# Patient Record
Sex: MALE | Race: WHITE | NOT HISPANIC OR LATINO | Employment: UNEMPLOYED | RURAL
[De-identification: names, ages, dates, MRNs, and addresses within clinical notes are randomized per-mention and may not be internally consistent; named-entity substitution may affect disease eponyms.]

---

## 2017-01-26 DIAGNOSIS — Z20.5 EXPOSURE TO HEPATITIS: Primary | ICD-10-CM

## 2017-01-28 LAB
HAV IGM SERPL QL IA: NEGATIVE
HBV CORE IGM SERPL QL IA: NEGATIVE
HBV SURFACE AG SERPL QL IA: NEGATIVE
HCV AB S/CO SERPL IA: <0.1 S/CO RATIO (ref 0–0.9)

## 2017-02-02 PROBLEM — Z00.00 WELLNESS EXAMINATION: Status: ACTIVE | Noted: 2017-02-02

## 2017-03-21 ENCOUNTER — OFFICE VISIT (OUTPATIENT)
Dept: FAMILY MEDICINE CLINIC | Facility: CLINIC | Age: 37
End: 2017-03-21

## 2017-03-21 VITALS
OXYGEN SATURATION: 97 % | TEMPERATURE: 98.4 F | DIASTOLIC BLOOD PRESSURE: 100 MMHG | HEIGHT: 64 IN | HEART RATE: 102 BPM | SYSTOLIC BLOOD PRESSURE: 146 MMHG | RESPIRATION RATE: 18 BRPM | BODY MASS INDEX: 26.98 KG/M2 | WEIGHT: 158 LBS

## 2017-03-21 DIAGNOSIS — G89.29 CHRONIC LOW BACK PAIN, UNSPECIFIED BACK PAIN LATERALITY, WITH SCIATICA PRESENCE UNSPECIFIED: Primary | Chronic | ICD-10-CM

## 2017-03-21 DIAGNOSIS — K76.0 FATTY LIVER: ICD-10-CM

## 2017-03-21 DIAGNOSIS — M19.90 ARTHRITIS: ICD-10-CM

## 2017-03-21 DIAGNOSIS — F10.20 ALCOHOLISM (HCC): ICD-10-CM

## 2017-03-21 DIAGNOSIS — F41.9 ANXIETY: Chronic | ICD-10-CM

## 2017-03-21 DIAGNOSIS — M54.5 CHRONIC LOW BACK PAIN, UNSPECIFIED BACK PAIN LATERALITY, WITH SCIATICA PRESENCE UNSPECIFIED: Primary | Chronic | ICD-10-CM

## 2017-03-21 PROCEDURE — 99213 OFFICE O/P EST LOW 20 MIN: CPT | Performed by: FAMILY MEDICINE

## 2017-03-21 RX ORDER — CHLORDIAZEPOXIDE HYDROCHLORIDE 25 MG/1
25 CAPSULE, GELATIN COATED ORAL 3 TIMES DAILY PRN
Qty: 30 CAPSULE | Refills: 0 | Status: SHIPPED | OUTPATIENT
Start: 2017-03-21 | End: 2017-08-10

## 2017-03-21 RX ORDER — METHYLPREDNISOLONE 4 MG/1
TABLET ORAL
Qty: 21 TABLET | Refills: 0 | Status: SHIPPED | OUTPATIENT
Start: 2017-03-21 | End: 2017-08-10

## 2017-03-21 NOTE — PATIENT INSTRUCTIONS
Stop aspirin while using the medrol/steroid  The steroid is a test; to see how you do with the arthritis  Should not drink while doing these Rx

## 2017-03-22 LAB
ALBUMIN SERPL-MCNC: 5.2 G/DL (ref 3.5–5.5)
ALBUMIN/GLOB SERPL: 1.8 {RATIO} (ref 1.2–2.2)
ALP SERPL-CCNC: 69 IU/L (ref 39–117)
ALT SERPL-CCNC: 221 IU/L (ref 0–44)
ANA SER QL: NEGATIVE
AST SERPL-CCNC: 269 IU/L (ref 0–40)
BASOPHILS # BLD AUTO: 0 X10E3/UL (ref 0–0.2)
BASOPHILS NFR BLD AUTO: 0 %
BILIRUB SERPL-MCNC: 0.7 MG/DL (ref 0–1.2)
BUN SERPL-MCNC: 7 MG/DL (ref 6–20)
BUN/CREAT SERPL: 9 (ref 8–19)
CALCIUM SERPL-MCNC: 9.5 MG/DL (ref 8.7–10.2)
CHLORIDE SERPL-SCNC: 95 MMOL/L (ref 96–106)
CO2 SERPL-SCNC: 21 MMOL/L (ref 18–29)
CREAT SERPL-MCNC: 0.74 MG/DL (ref 0.76–1.27)
CRP SERPL-MCNC: 4.2 MG/L (ref 0–4.9)
EOSINOPHIL # BLD AUTO: 0.2 X10E3/UL (ref 0–0.4)
EOSINOPHIL NFR BLD AUTO: 3 %
ERYTHROCYTE [DISTWIDTH] IN BLOOD BY AUTOMATED COUNT: 14.3 % (ref 12.3–15.4)
ERYTHROCYTE [SEDIMENTATION RATE] IN BLOOD BY WESTERGREN METHOD: 13 MM/HR (ref 0–15)
GLOBULIN SER CALC-MCNC: 2.9 G/DL (ref 1.5–4.5)
GLUCOSE SERPL-MCNC: 134 MG/DL (ref 65–99)
HCT VFR BLD AUTO: 47.9 % (ref 37.5–51)
HGB BLD-MCNC: 16.7 G/DL (ref 12.6–17.7)
IMM GRANULOCYTES # BLD: 0 X10E3/UL (ref 0–0.1)
IMM GRANULOCYTES NFR BLD: 0 %
LYMPHOCYTES # BLD AUTO: 2 X10E3/UL (ref 0.7–3.1)
LYMPHOCYTES NFR BLD AUTO: 27 %
MCH RBC QN AUTO: 32.7 PG (ref 26.6–33)
MCHC RBC AUTO-ENTMCNC: 34.9 G/DL (ref 31.5–35.7)
MCV RBC AUTO: 94 FL (ref 79–97)
MONOCYTES # BLD AUTO: 1.1 X10E3/UL (ref 0.1–0.9)
MONOCYTES NFR BLD AUTO: 15 %
NEUTROPHILS # BLD AUTO: 4.1 X10E3/UL (ref 1.4–7)
NEUTROPHILS NFR BLD AUTO: 55 %
PLATELET # BLD AUTO: 191 X10E3/UL (ref 150–379)
POTASSIUM SERPL-SCNC: 4 MMOL/L (ref 3.5–5.2)
PROT SERPL-MCNC: 8.1 G/DL (ref 6–8.5)
RBC # BLD AUTO: 5.1 X10E6/UL (ref 4.14–5.8)
RHEUMATOID FACT SERPL-ACNC: <10 IU/ML (ref 0–13.9)
SODIUM SERPL-SCNC: 140 MMOL/L (ref 134–144)
URATE SERPL-MCNC: 4.6 MG/DL (ref 3.7–8.6)
WBC # BLD AUTO: 7.3 X10E3/UL (ref 3.4–10.8)

## 2017-03-22 NOTE — PROGRESS NOTES
Subjective   Andi Curran is a 36 y.o. male presenting with chief complaint of:   Chief Complaint   Patient presents with   • Arthritis       History of Present Illness :  With girlfriend.  Has multiple chronic problems; interval appointment.  One of these problems is pain joints; hands/feet others. Says needs not a pain Rx but an arthritis Rx.   Still drinking; two beers before coming.  Makes him nervous.  Has exposure to hepatitis C from girlfriend; but he was recently checked and was neg.      Other chronic problem/s to consider:  Alcoholism:  Has had since teens/it is chronic.  Has done rehab repeatedly.  Has never stopped drinking long. Has no license because of.   GE reflux: This has been present for years/over a year.  It is chronic.   It is stable as there is no change in infrequent heartburn and no dysphagia    The following portions of the patient's history were reviewed and updated as appropriate: allergies, current medications, past family history, past medical history, past social history, past surgical history and problem list.  TCC migrated if needed/reviewed.    Current Outpatient Prescriptions:   •  aspirin 81 MG EC tablet, Take 81 mg by mouth Daily., Disp: , Rfl:   •  Multiple Vitamins-Minerals (PX MENS MULTIVITAMINS) tablet, Take 1 tablet by mouth Daily., Disp: , Rfl:   •  pantoprazole (PROTONIX) 40 MG EC tablet, Take 1 tablet by mouth Daily., Disp: 15 tablet, Rfl: 0  •  propranolol (INDERAL) 20 MG tablet, Take 1 tablet by mouth 3 (Three) Times a Day., Disp: 90 tablet, Rfl: 5    No Known Allergies    Review of Systems  GENERAL:  Inactive/slower with limits, speed, samni for age desire. Sleep is often shallow; apnea denied. No fever now.  ENDO:  No syncope, near or diaphoretic sweaty spells.  HEENT: No head injury or headache,  No vision change, No hearing loss.  Ears without pain/drainage.  No sore throat.  No nasal/sinus congestion/drainage. No epistaxis.  CHEST: No chest wall tenderness or mass.  "No cough,  without wheeze, SOB; no hemoptysis.  CV: No chest pain, palpatations, ankle edema.  GI: No heartburn, dysphagia.  No abdominal pain, diarrhea, constipation, rectal bleeding, or melena.    :  Voids without dysuria, or incontience to completion.  ORTHO: No painful/swollen joints but various on /off sore.  No change occ sore neck or back.  No acute neck or back pain without recent injury.   NEURO: No dizziness, weakness of extremities.  No numbness/parethesias.   PSYCH: No memory loss.  Mood good; mildly anxious, depressed but/and not suicidal.  Tolerated stress.     Results for orders placed or performed in visit on 01/26/17   Hepatitis Diagnostic Profile   Result Value Ref Range    Hep A IgM Negative Negative    Hepatitis B Surface Ag Negative Negative    Hep B Core IgM Negative Negative   Hepatitis C Antibody   Result Value Ref Range    Hep C Virus Ab <0.1 0.0 - 0.9 s/co ratio       CBC:     BMP:    THYROID:      Invalid input(s): T4  LIPID:      Invalid input(s): TOTAL CHOLESTROL, TRIGLYCERIDES  PSA:      No results found for: HGBA1C    Lab Results   Component Value Date     10/28/2016     07/23/2015    K 4.4 10/28/2016    CL 96 (L) 10/28/2016    CO2 23 10/28/2016    BUN 5 (L) 10/28/2016    CREATININE 0.86 10/28/2016    CALCIUM 9.6 10/28/2016       No results found for: PSA     Objective   Visit Vitals   • /100 (BP Location: Right arm, Patient Position: Sitting, Cuff Size: Adult)   • Pulse 102   • Temp 98.4 °F (36.9 °C) (Oral)   • Resp 18   • Ht 64\" (162.6 cm)   • Wt 158 lb (71.7 kg)   • SpO2 97%   • BMI 27.12 kg/m2       Physical Exam  GENERAL:  Well nourished/developed in no acute distress. Thin; smells of alcohol  SKIN: Turgor excellent, without wound, rash, lesion.  HEENT: Normal cephalic without trauma.  Pupils equal round reactive to light. Extraocular motions full without nystagmus.   External canals nonobstructive nontender without reddness. Tymphatic membranes sheri with " alex structures intact.   Oral cavity without growths, exudates, and moist.  Posterior pharnyx without mass, obstruction, reddness.  No thyroidmegaly, mass, tenderness, lymphadenopathy and supple.  CV: Regular rhythm.  No murmur, gallop,  edema. Posterior pulses intact.  No carotid bruits.  CHEST: No chest wall tenderness or mass.   LUNGS: Symmetric motion with clear to auscultation.  No dullness to percussion  ABD: Soft, nontender without mass.   ORTHO: Symmetric extremities without swelling/point tenderness.  Full gross range of motion.    NEURO: CN 2-12 grossly intact.  Symmetric facies. 1/4 x bicep knee equal reflexes.  UE/LE   3/5 strength throughout.  Nonfocal use extremities. Speech clear.    PSYCH: Oriented x 3.  Pleasant anxious mildly well kept.  Shallow; somewhat purposeful/directed conservation with intact short/long gross memory.     Assessment/Plan     1. Chronic low back pain, unspecified back pain laterality, with sciatica presence unspecified  - Sedimentation rate, automated  - C-reactive protein  - Uric acid  - YUNG  - Comprehensive metabolic panel  - CBC & Differential  - RHEUMATOID FACTOR    2. Fatty liver  - Sedimentation rate, automated  - C-reactive protein  - Uric acid  - YUNG  - Comprehensive metabolic panel  - CBC & Differential    3. Anxiety  Chronic; sometimes alcohol excerbated    4. Alcoholism  ongoing    5. Arthritis  Suggestive hands/feet systemic; but no findings on exam.       Rx: reviewed; today trial medrol, librium  LAB: reviewed/above; today RA profile/sed-CR proteins  Wrap-up/other instructions  Regular cardio exercise something everyone should consider and try to do; discussed  Normal weight a goal for everyone; discussed  Healthy diet helpful for weight management, illness prevention; discussed  If over 50-screening exams include men PSA/rectal exam, women mammograms, and  everyone colonoscopy screening for colon cancer.   Patient Instructions   Stop aspirin while using the  medrol/steroid  The steroid is a test; to see how you do with the arthritis  Should not drink while doing these Rx        Follow up: Return for 1m-no drinking when comes to the office.

## 2017-03-23 DIAGNOSIS — R05.9 COUGH: Primary | ICD-10-CM

## 2017-03-23 NOTE — PROGRESS NOTES
Pt informed about labs and elevated liver. Was told to stop drinking. He wants to be referred to a lung specialist. Says he had discussed this with Dr. Alexander already

## 2017-08-10 ENCOUNTER — OFFICE VISIT (OUTPATIENT)
Dept: FAMILY MEDICINE CLINIC | Facility: CLINIC | Age: 37
End: 2017-08-10

## 2017-08-10 VITALS
WEIGHT: 147 LBS | BODY MASS INDEX: 25.1 KG/M2 | TEMPERATURE: 98.6 F | HEART RATE: 94 BPM | DIASTOLIC BLOOD PRESSURE: 60 MMHG | RESPIRATION RATE: 18 BRPM | SYSTOLIC BLOOD PRESSURE: 120 MMHG | HEIGHT: 64 IN | OXYGEN SATURATION: 95 %

## 2017-08-10 DIAGNOSIS — M25.50 ARTHRALGIA, UNSPECIFIED JOINT: ICD-10-CM

## 2017-08-10 DIAGNOSIS — F41.9 ANXIETY: Chronic | ICD-10-CM

## 2017-08-10 DIAGNOSIS — F10.20 ALCOHOLISM (HCC): ICD-10-CM

## 2017-08-10 DIAGNOSIS — K75.9 HEPATITIS: ICD-10-CM

## 2017-08-10 DIAGNOSIS — F32.A DEPRESSION, UNSPECIFIED DEPRESSION TYPE: Primary | Chronic | ICD-10-CM

## 2017-08-10 PROCEDURE — 99213 OFFICE O/P EST LOW 20 MIN: CPT | Performed by: FAMILY MEDICINE

## 2017-08-11 ENCOUNTER — TELEPHONE (OUTPATIENT)
Dept: FAMILY MEDICINE CLINIC | Facility: CLINIC | Age: 37
End: 2017-08-11

## 2017-08-18 DIAGNOSIS — F10.10 ALCOHOL ABUSE: ICD-10-CM

## 2017-08-18 DIAGNOSIS — F32.A DEPRESSION, UNSPECIFIED DEPRESSION TYPE: Primary | ICD-10-CM

## 2017-09-11 RX ORDER — PROPRANOLOL HYDROCHLORIDE 20 MG/1
20 TABLET ORAL 3 TIMES DAILY
Qty: 90 TABLET | Refills: 5 | Status: SHIPPED | OUTPATIENT
Start: 2017-09-11

## 2017-11-16 ENCOUNTER — OFFICE VISIT (OUTPATIENT)
Dept: PSYCHIATRY | Age: 37
End: 2017-11-16
Payer: MEDICAID

## 2017-11-16 VITALS
OXYGEN SATURATION: 98 % | SYSTOLIC BLOOD PRESSURE: 131 MMHG | HEIGHT: 69 IN | BODY MASS INDEX: 20.88 KG/M2 | WEIGHT: 141 LBS | DIASTOLIC BLOOD PRESSURE: 90 MMHG | HEART RATE: 88 BPM

## 2017-11-16 DIAGNOSIS — F10.10 ALCOHOL ABUSE: ICD-10-CM

## 2017-11-16 DIAGNOSIS — F41.1 GENERALIZED ANXIETY DISORDER: Primary | ICD-10-CM

## 2017-11-16 DIAGNOSIS — Z79.899 ENCOUNTER FOR LONG-TERM (CURRENT) USE OF MEDICATIONS: Primary | ICD-10-CM

## 2017-11-16 PROCEDURE — 90791 PSYCH DIAGNOSTIC EVALUATION: CPT | Performed by: COUNSELOR

## 2017-11-16 PROCEDURE — 99999 PR OFFICE/OUTPT VISIT,PROCEDURE ONLY: CPT | Performed by: COUNSELOR

## 2017-11-16 RX ORDER — ASPIRIN 81 MG/1
81 TABLET ORAL
COMMUNITY

## 2017-11-16 RX ORDER — MULTIVIT-MINERALS/FA/LYCOPENE 0.4 MG-6
1 TABLET ORAL
COMMUNITY

## 2017-11-16 RX ORDER — PROPRANOLOL HYDROCHLORIDE 20 MG/1
20 TABLET ORAL
COMMUNITY
Start: 2017-09-11

## 2017-12-05 ENCOUNTER — OFFICE VISIT (OUTPATIENT)
Dept: PSYCHIATRY | Age: 37
End: 2017-12-05
Payer: MEDICAID

## 2017-12-05 VITALS
SYSTOLIC BLOOD PRESSURE: 128 MMHG | HEART RATE: 92 BPM | WEIGHT: 141 LBS | DIASTOLIC BLOOD PRESSURE: 89 MMHG | OXYGEN SATURATION: 98 % | HEIGHT: 69 IN | BODY MASS INDEX: 20.88 KG/M2

## 2017-12-05 DIAGNOSIS — F10.10 ALCOHOL ABUSE: ICD-10-CM

## 2017-12-05 DIAGNOSIS — F41.1 GENERALIZED ANXIETY DISORDER: Primary | ICD-10-CM

## 2017-12-05 PROCEDURE — 99205 OFFICE O/P NEW HI 60 MIN: CPT | Performed by: NURSE PRACTITIONER
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
## 2018-01-11 ENCOUNTER — OFFICE VISIT (OUTPATIENT)
Dept: PSYCHIATRY | Age: 38
End: 2018-01-11
Payer: MEDICAID

## 2018-01-11 VITALS
DIASTOLIC BLOOD PRESSURE: 93 MMHG | HEART RATE: 83 BPM | OXYGEN SATURATION: 98 % | HEIGHT: 69 IN | SYSTOLIC BLOOD PRESSURE: 134 MMHG

## 2018-01-11 DIAGNOSIS — F33.1 MODERATE EPISODE OF RECURRENT MAJOR DEPRESSIVE DISORDER (HCC): ICD-10-CM

## 2018-01-11 DIAGNOSIS — F41.1 GENERALIZED ANXIETY DISORDER: Primary | ICD-10-CM

## 2018-01-11 DIAGNOSIS — F10.10 ALCOHOL ABUSE: ICD-10-CM

## 2018-01-11 PROCEDURE — 99214 OFFICE O/P EST MOD 30 MIN: CPT | Performed by: NURSE PRACTITIONER

## 2018-01-11 RX ORDER — SERTRALINE HYDROCHLORIDE 100 MG/1
100 TABLET, FILM COATED ORAL DAILY
Qty: 30 TABLET | Refills: 0 | Status: SHIPPED | OUTPATIENT
Start: 2018-01-11 | End: 2018-02-15 | Stop reason: SINTOL
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
## 2018-01-18 ENCOUNTER — HOSPITAL ENCOUNTER (EMERGENCY)
Dept: HOSPITAL 58 - ED | Age: 38
Discharge: HOME | End: 2018-01-18
Payer: COMMERCIAL

## 2018-01-18 ENCOUNTER — HOSPITAL ENCOUNTER (OUTPATIENT)
Dept: HOSPITAL 58 - AMBL | Age: 38
Discharge: ANOTHER HOSPITAL | End: 2018-01-18
Attending: FAMILY MEDICINE

## 2018-01-18 VITALS — TEMPERATURE: 98.7 F | DIASTOLIC BLOOD PRESSURE: 87 MMHG | SYSTOLIC BLOOD PRESSURE: 134 MMHG

## 2018-01-18 VITALS — BODY MASS INDEX: 22.4 KG/M2

## 2018-01-18 DIAGNOSIS — R25.3: Primary | ICD-10-CM

## 2018-01-18 DIAGNOSIS — F17.210: ICD-10-CM

## 2018-01-18 DIAGNOSIS — F10.10: ICD-10-CM

## 2018-01-18 DIAGNOSIS — R03.0: ICD-10-CM

## 2018-01-18 DIAGNOSIS — G25.2: Primary | ICD-10-CM

## 2018-01-18 DIAGNOSIS — F43.0: ICD-10-CM

## 2018-01-18 DIAGNOSIS — F41.1: ICD-10-CM

## 2018-01-18 DIAGNOSIS — F45.8: ICD-10-CM

## 2018-01-18 PROCEDURE — 82803 BLOOD GASES ANY COMBINATION: CPT

## 2018-01-18 PROCEDURE — 36415 COLL VENOUS BLD VENIPUNCTURE: CPT

## 2018-01-18 PROCEDURE — 99285 EMERGENCY DEPT VISIT HI MDM: CPT

## 2018-01-18 PROCEDURE — 80053 COMPREHEN METABOLIC PANEL: CPT

## 2018-01-18 PROCEDURE — 93010 ELECTROCARDIOGRAM REPORT: CPT

## 2018-01-18 PROCEDURE — 80306 DRUG TEST PRSMV INSTRMNT: CPT

## 2018-01-18 PROCEDURE — 81001 URINALYSIS AUTO W/SCOPE: CPT

## 2018-01-18 PROCEDURE — 93005 ELECTROCARDIOGRAM TRACING: CPT

## 2018-01-18 PROCEDURE — 85025 COMPLETE CBC W/AUTO DIFF WBC: CPT

## 2018-01-24 ENCOUNTER — TELEPHONE (OUTPATIENT)
Dept: FAMILY MEDICINE CLINIC | Facility: CLINIC | Age: 38
End: 2018-01-24

## 2018-01-24 NOTE — TELEPHONE ENCOUNTER
"Vm from spouse Eileen \"can he get a refill of Librium?\"  Last refill  Librium 25mg TID Prn, #30 on 3-21-17  "

## 2018-01-25 RX ORDER — CHLORDIAZEPOXIDE HYDROCHLORIDE 25 MG/1
25 CAPSULE, GELATIN COATED ORAL 3 TIMES DAILY PRN
Qty: 30 CAPSULE | Refills: 0 | OUTPATIENT
Start: 2018-01-25 | End: 2018-01-29 | Stop reason: SDUPTHER

## 2018-01-25 NOTE — TELEPHONE ENCOUNTER
Current Outpatient Prescriptions:   •  propranolol (INDERAL) 20 MG tablet, Take 1 tablet by mouth 3 (Three) Times a Day., Disp: 90 tablet, Rfl: 5    If he plans to have librium as he has used in the past; may have refill  Advising he keep up/keep working with   A. Psych  B. Mental health

## 2018-01-25 NOTE — TELEPHONE ENCOUNTER
If he is getting any Rx from psych; he needs to call them about librium (it is a psych Rx and they need to know)    If he is not getting an Rx from them; we need his Rx lis updated

## 2018-01-25 NOTE — TELEPHONE ENCOUNTER
Pt notified and states he slowly getting off alcohol and needs the librium and pt states he sees psych not that long ago and sees Mental health again tomorrow for alcohol classes states he had anxiety attack the other day and went to [facility] ER pt is all over the place in this conversation states he is drinking very little [phone]

## 2018-01-29 ENCOUNTER — OFFICE VISIT (OUTPATIENT)
Dept: FAMILY MEDICINE CLINIC | Facility: CLINIC | Age: 38
End: 2018-01-29

## 2018-01-29 ENCOUNTER — TELEPHONE (OUTPATIENT)
Dept: FAMILY MEDICINE CLINIC | Facility: CLINIC | Age: 38
End: 2018-01-29

## 2018-01-29 VITALS
OXYGEN SATURATION: 98 % | DIASTOLIC BLOOD PRESSURE: 100 MMHG | BODY MASS INDEX: 25.78 KG/M2 | HEIGHT: 64 IN | TEMPERATURE: 99.2 F | RESPIRATION RATE: 18 BRPM | HEART RATE: 88 BPM | SYSTOLIC BLOOD PRESSURE: 142 MMHG | WEIGHT: 151 LBS

## 2018-01-29 DIAGNOSIS — M54.5 CHRONIC LOW BACK PAIN, UNSPECIFIED BACK PAIN LATERALITY, WITH SCIATICA PRESENCE UNSPECIFIED: Chronic | ICD-10-CM

## 2018-01-29 DIAGNOSIS — Z79.01 ANTICOAGULATED: ICD-10-CM

## 2018-01-29 DIAGNOSIS — R25.1 TREMOR: ICD-10-CM

## 2018-01-29 DIAGNOSIS — F10.939 ALCOHOL WITHDRAWAL SYNDROME WITH COMPLICATION (HCC): ICD-10-CM

## 2018-01-29 DIAGNOSIS — G89.29 CHRONIC LOW BACK PAIN, UNSPECIFIED BACK PAIN LATERALITY, WITH SCIATICA PRESENCE UNSPECIFIED: Chronic | ICD-10-CM

## 2018-01-29 DIAGNOSIS — F10.20 ALCOHOLISM (HCC): Primary | ICD-10-CM

## 2018-01-29 DIAGNOSIS — R03.0 ELEVATED BLOOD PRESSURE READING: ICD-10-CM

## 2018-01-29 DIAGNOSIS — D75.1 ERYTHROCYTOSIS: Chronic | ICD-10-CM

## 2018-01-29 DIAGNOSIS — F32.A DEPRESSION, UNSPECIFIED DEPRESSION TYPE: Chronic | ICD-10-CM

## 2018-01-29 DIAGNOSIS — K75.9 HEPATITIS: ICD-10-CM

## 2018-01-29 PROBLEM — Z01.89 LABORATORY TEST: Status: ACTIVE | Noted: 2018-01-29

## 2018-01-29 PROCEDURE — 99214 OFFICE O/P EST MOD 30 MIN: CPT | Performed by: FAMILY MEDICINE

## 2018-01-29 RX ORDER — CHLORDIAZEPOXIDE HYDROCHLORIDE 25 MG/1
25 CAPSULE, GELATIN COATED ORAL 3 TIMES DAILY PRN
Qty: 45 CAPSULE | Refills: 2 | OUTPATIENT
Start: 2018-01-29 | End: 2018-02-12 | Stop reason: SDUPTHER

## 2018-01-29 RX ORDER — OMEPRAZOLE 20 MG/1
20 CAPSULE, DELAYED RELEASE ORAL DAILY
Qty: 30 CAPSULE | Refills: 5 | Status: SHIPPED | OUTPATIENT
Start: 2018-01-29 | End: 2019-01-24 | Stop reason: SDUPTHER

## 2018-01-29 RX ORDER — AMLODIPINE BESYLATE 5 MG/1
5 TABLET ORAL DAILY
Qty: 30 TABLET | Refills: 5 | Status: SHIPPED | OUTPATIENT
Start: 2018-01-29

## 2018-01-29 NOTE — PATIENT INSTRUCTIONS
NOTE:  You have got to have a successful response to alcohol treatment/etc and stop once and for all.   Be aware; it is doubted that your body with age, number of years of drinking can/will continue to tolerate this without something medically going badly wrong and being life threatening.     Get back on baby aspirin     No motrin, alleve and these type of medications.

## 2018-01-29 NOTE — PROGRESS NOTES
Subjective   Andi Curran is a 37 y.o. male presenting with chief complaint of:   Chief Complaint   Patient presents with   • Hypertension     [facility] ER F/U   • Anxiety     [facility] ER F/U   • Back Pain      and neck pain ortho referall Dr Jerzy Nazario        History of Present Illness :  with female .  Here for primarily a/an Chronic issue today; needs refill of Librium.  Recent call for; given.  Has been seeing /mental health but they are not treating his alcoholism.  He under court order is working with [facility] mental health.  Still drinking; saying he plans/needs to quit.  Admits to tremor; no confusion.  2016 CT abdomen liver ok.  Same time Hepatitis A, B, C neg. .   Has has  multiple chronic problems to consider and alcoholism is one.     Other chronic problem/s to consider:   HTN.  The HTN has been present for years/it is chronic.  The HTN is assumed essential/without testing needed to look for other.  The HTN is not controlled manifest by todays blood pressure and no home monitoring. Constantly lost to f/u.  Associated illness below.   Chronic back pain:  The pain has been present for years/over a year.   It is chronic.   The pain is stable.  2-3 /10 pain most of time and usually is worse after activities.  The pain limits activities.  The problem has been evaulated and the patient has been seeing a chiropracter; wants referral to back doctor.   Anticoagulation: Requires anticoagulation with ASA for polycythemia vera (saw Dr Shah and was opinion; was treated awhile with ASA and phebotomy).   This needs to be continually monitored for risk/benefit.   He ran out of his ASA.   Tobacco us:  A chronic problem.  Has smoked for years.  He has not seriously tried to quit before.  Not interested in help quitting.  Tremor:  He says nerves for years/chronic.  Working diagnosis better alcoholism.     Has an/another acute issue today: none.    The following portions of the patient's history were reviewed and updated  "as appropriate: allergies, current medications, past family history, past medical history, past social history, past surgical history and problem list.  Records acquired and reviewed; TCC migrated.      Current Outpatient Prescriptions:   •  chlordiazePOXIDE (LIBRIUM) 25 MG capsule, Take 1 capsule by mouth 3 (Three) Times a Day As Needed for Anxiety., Disp: 45 capsule, Rfl: 2  •  propranolol (INDERAL) 20 MG tablet, Take 1 tablet by mouth 3 (Three) Times a Day., Disp: 90 tablet, Rfl: 5  •  sertraline (ZOLOFT) 50 MG tablet, Take 50 mg by mouth Daily., Disp: , Rfl:   •  amLODIPine (NORVASC) 5 MG tablet, Take 1 tablet by mouth Daily., Disp: 30 tablet, Rfl: 5  •  omeprazole (priLOSEC) 20 MG capsule, Take 1 capsule by mouth Daily., Disp: 30 capsule, Rfl: 5    No problems with medications.  Refills if needed done    Allergies   Allergen Reactions   • Primidone       -        Review of Systems  GENERAL:  Inactive/slower with limits, speed, stamina for age and anything to do. Sleep is sometimes restless. No fever now.  ENDO:  No syncope, near or diaphoretic sweaty spells.  HEENT: No head injury often \"migraine\"  headache,  No vision change, No significant hearing loss.  Ears without pain/drainage.  No sore throat.  No significant nasal/sinus congestion/drainage. No epistaxis.  CHEST: No chest wall tenderness or mass. Same smoking/and cough,  With occ wheeze.  No SOB; no hemoptysis.  CV: No chest pain, palpitations, ankle edema.  GI: No heartburn, dysphagia.  No abdominal pain, diarrhea, constipation.  No rectal bleeding, or melena.  Hx h pylori, ulcer.   :  Voids without dysuria, or  incontinence to completion.  ORTHO: No painful/swollen joints but various on /off sore.  Daily sore neck or back.  No acute neck or back pain without recent injury.  NEURO: No dizziness, weakness of extremities.  LE numbness/paresthesias.   PSYCH: No memory loss.  Mood good; often anxious, depressed but/and not suicidal.  Tries to tolerate " stress .     Results for orders placed or performed in visit on 03/21/17   Sedimentation rate, automated   Result Value Ref Range    Sed Rate 13 0 - 15 mm/hr   C-reactive protein   Result Value Ref Range    C-Reactive Protein 4.2 0.0 - 4.9 mg/L   Uric acid   Result Value Ref Range    Uric Acid 4.6 3.7 - 8.6 mg/dL   YUNG   Result Value Ref Range    YUNG Direct Negative Negative   Comprehensive metabolic panel   Result Value Ref Range    Glucose 134 (H) 65 - 99 mg/dL    BUN 7 6 - 20 mg/dL    Creatinine 0.74 (L) 0.76 - 1.27 mg/dL    eGFR Non African Am 118 >59 mL/min/1.73    eGFR African Am 136 >59 mL/min/1.73    BUN/Creatinine Ratio 9 8 - 19    Sodium 140 134 - 144 mmol/L    Potassium 4.0 3.5 - 5.2 mmol/L    Chloride 95 (L) 96 - 106 mmol/L    Total CO2 21 18 - 29 mmol/L    Calcium 9.5 8.7 - 10.2 mg/dL    Total Protein 8.1 6.0 - 8.5 g/dL    Albumin 5.2 3.5 - 5.5 g/dL    Globulin 2.9 1.5 - 4.5 g/dL    A/G Ratio 1.8 1.2 - 2.2    Total Bilirubin 0.7 0.0 - 1.2 mg/dL    Alkaline Phosphatase 69 39 - 117 IU/L    AST (SGOT) 269 (H) 0 - 40 IU/L    ALT (SGPT) 221 (H) 0 - 44 IU/L   RHEUMATOID FACTOR   Result Value Ref Range    RA Latex Turbid <10.0 0.0 - 13.9 IU/mL   CBC & Differential   Result Value Ref Range    WBC 7.3 3.4 - 10.8 x10E3/uL    RBC 5.10 4.14 - 5.80 x10E6/uL    Hemoglobin 16.7 12.6 - 17.7 g/dL    Hematocrit 47.9 37.5 - 51.0 %    MCV 94 79 - 97 fL    MCH 32.7 26.6 - 33.0 pg    MCHC 34.9 31.5 - 35.7 g/dL    RDW 14.3 12.3 - 15.4 %    Platelets 191 150 - 379 x10E3/uL    Neutrophil Rel % 55 %    Lymphocyte Rel % 27 %    Monocyte Rel % 15 %    Eosinophil Rel % 3 %    Basophil Rel % 0 %    Neutrophils Absolute 4.1 1.4 - 7.0 x10E3/uL    Lymphocytes Absolute 2.0 0.7 - 3.1 x10E3/uL    Monocytes Absolute 1.1 (H) 0.1 - 0.9 x10E3/uL    Eosinophils Absolute 0.2 0.0 - 0.4 x10E3/uL    Basophils Absolute 0.0 0.0 - 0.2 x10E3/uL    Immature Granulocyte Rel % 0 %    Immature Grans Absolute 0.0 0.0 - 0.1 x10E3/uL       No results found  "for: HGBA1C    Lab Results   Component Value Date     03/21/2017     10/28/2016     07/23/2015    K 4.0 03/21/2017    K 4.4 10/28/2016    CL 95 (L) 03/21/2017    CL 96 (L) 10/28/2016    CO2 21 03/21/2017    CO2 23 10/28/2016    BUN 7 03/21/2017    BUN 5 (L) 10/28/2016    CREATININE 0.74 (L) 03/21/2017    CREATININE 0.86 10/28/2016    CALCIUM 9.5 03/21/2017    CALCIUM 9.6 10/28/2016       No results found for: PSA     Lab Results:  CBC:    Lab Results - Last 18 Months  Lab Units 03/21/17  1315 11/10/16  0924 10/28/16  1352   WBC x10E3/uL 7.3 9.8 7.7   HEMATOCRIT % 47.9 50.4 49.4     CMP:    Lab Results - Last 18 Months  Lab Units 03/21/17  1315 10/28/16  1352   SODIUM mmol/L 140 140   CHLORIDE mmol/L 95* 96*   TOTAL CO2, ARTERIAL mmol/L 21 23   BUN mg/dL 7 5*   CREATININE mg/dL 0.74* 0.86   EGFR IF NONAFRICN AM mL/min/1.73 118 111   EGFR IF AFRICN AM mL/min/1.73 136 128   CALCIUM mg/dL 9.5 9.6     THYROID:No results for input(s): TSH, T3FREE, FREET4 in the last 12812 hours.    Invalid input(s): T3, T4  A1C:No results for input(s): HGBA1C in the last 13984 hours.    Objective   /100  Pulse 88  Temp 99.2 °F (37.3 °C) (Oral)   Resp 18  Ht 162.6 cm (64\")  Wt 68.5 kg (151 lb)  SpO2 98%  BMI 25.92 kg/m2    Physical Exam  GENERAL:  Well nourished/developed in no acute distress.  SKIN: Turgor excellent, without wound, rash, lesion.  HEENT: Normal cephalic without trauma.  Pupils equal round reactive to light. Extraocular motions full without nystagmus.   External canals nonobstructive nontender without reddness. Tymphatic membranes sheri with alex structures intact.   Oral cavity without growths, exudates, and moist.  Posterior pharynx without mass, obstruction, redness.  No thyromegaly, mass, tenderness, lymphadenopathy and supple.  CV: Regular rhythm.  No murmur, gallop,  edema. Posterior pulses intact.  No carotid bruits.  CHEST: No chest wall tenderness or mass.   LUNGS: Symmetric motion " with coarse; occ wheeze to auscultation.  No dullness to percussion  ABD: Soft, nontender without mass.   ORTHO: Symmetric extremities without swelling/point tenderness.  Full gross range of motion.  NEURO: CN 2-12 grossly intact.  Symmetric facies. 1/4 x bicep knee equal reflexes.  UE/LE   3-4/5 strength throughout.  Nonfocal use extremities. Speech clear.   Fatigue tremor.     PSYCH: Oriented x 3.  Pleasant calm, well kept.  Purposeful/directed conservation with intact short/long gross memory.     Assessment/Plan     1. Alcoholism    2. Depression, unspecified depression type    3. Erythrocytosis    4. Elevated blood pressure reading    5. Alcohol withdrawal syndrome with complication    6. Anticoagulated-erythrocytosis/ASA 81    7. Tremor-alcohol    8. Hepatitis-only proof alcohol.     9. Chronic low back pain, unspecified back pain laterality, with sciatica presence unspecified        Rx: reviewed/changes:  Librium refill  Restart HTN Rx; start Norvasc 5  Stay on inderal  Multivitamin OTC suggested  Get back and stay on ASA  We cannot refer to ortho with him drinking.     LAB: reviewed/orders:   3m CBC, CMP  12m CBC, CMP, LIPID, TSH, Vit D    Discussions:   Way too young to be this ill; and he has promised for years to stop drinking; his sencerity to this is very poor.  His prognosis is very poor for much longer to liver either through injury, cirrhosis, cancer, MI/CVA, dementia; if he does not stop drinking and smoking.     Patient Instructions   NOTE:  You have got to have a successful response to alcohol treatment/etc and stop once and for all.   Be aware; it is doubted that your body with age, number of years of drinking can/will continue to tolerate this without something medically going badly wrong and being life threatening.     Get back on baby aspirin     No motrin, alleve and these type of medications.       Follow up: Return for ro 2m.  Future Appointments  Date Time Provider Department Center    4/16/2018 1:15 PM Moose Alexander MD [facility] None

## 2018-02-09 ENCOUNTER — TELEPHONE (OUTPATIENT)
Dept: FAMILY MEDICINE CLINIC | Facility: CLINIC | Age: 38
End: 2018-02-09

## 2018-02-09 NOTE — TELEPHONE ENCOUNTER
Eileen Curran came in requesting a refill of Librium states that pt has not drank any alcohol since last dr apt with Dr Alexander but he is almost out of librium and she does not believe pt is ready to stop taking them MD2, [phone] last refill was 1/29/18 # 45 and it also had 2 refills on it

## 2018-02-12 DIAGNOSIS — F10.20 ALCOHOLISM (HCC): ICD-10-CM

## 2018-02-12 DIAGNOSIS — F10.939 ALCOHOL WITHDRAWAL SYNDROME WITH COMPLICATION (HCC): ICD-10-CM

## 2018-02-12 RX ORDER — CHLORDIAZEPOXIDE HYDROCHLORIDE 25 MG/1
CAPSULE, GELATIN COATED ORAL
Qty: 30 CAPSULE | Refills: 0 | Status: SHIPPED | OUTPATIENT
Start: 2018-02-12 | End: 2018-04-25 | Stop reason: SDUPTHER

## 2018-02-15 ENCOUNTER — OFFICE VISIT (OUTPATIENT)
Dept: PSYCHIATRY | Age: 38
End: 2018-02-15
Payer: MEDICAID

## 2018-02-15 VITALS
SYSTOLIC BLOOD PRESSURE: 115 MMHG | OXYGEN SATURATION: 100 % | WEIGHT: 158.3 LBS | BODY MASS INDEX: 23.44 KG/M2 | DIASTOLIC BLOOD PRESSURE: 75 MMHG | HEART RATE: 78 BPM | HEIGHT: 69 IN

## 2018-02-15 DIAGNOSIS — F41.1 GENERALIZED ANXIETY DISORDER: Primary | ICD-10-CM

## 2018-02-15 DIAGNOSIS — F10.10 ALCOHOL ABUSE: ICD-10-CM

## 2018-02-15 DIAGNOSIS — F33.1 MODERATE EPISODE OF RECURRENT MAJOR DEPRESSIVE DISORDER (HCC): ICD-10-CM

## 2018-02-15 PROCEDURE — 99214 OFFICE O/P EST MOD 30 MIN: CPT | Performed by: NURSE PRACTITIONER

## 2018-02-15 RX ORDER — OMEPRAZOLE 20 MG/1
20 CAPSULE, DELAYED RELEASE ORAL DAILY
COMMUNITY
Start: 2018-01-29

## 2018-02-15 RX ORDER — AMLODIPINE BESYLATE 5 MG/1
5 TABLET ORAL DAILY
COMMUNITY
Start: 2018-01-29

## 2018-02-15 RX ORDER — ACAMPROSATE CALCIUM 333 MG/1
TABLET, DELAYED RELEASE ORAL
Qty: 90 TABLET | Refills: 3 | Status: SHIPPED | OUTPATIENT
Start: 2018-02-15

## 2018-02-15 NOTE — PROGRESS NOTES
tablet Take 1 tablet twice daily 2/15/18  Yes [place] Lesch, APRN   aspirin EC 81 MG EC tablet Take 81 mg by mouth   Yes Historical Provider, MD   Multiple Vitamins-Minerals (PX MENS MULTIVITAMINS) TABS Take 1 tablet by mouth   Yes Historical Provider, MD   propranolol (INDERAL) 20 MG tablet Take 20 mg by mouth 9/11/17  Yes Historical Provider, MD           MSE:  Patient is  A & O x3. Appearance:  street clothes appropriately dressed for season and age. Cognition:  Recent memory intact , remote memory intact , fair fund of knowledge, average  intelligence level. Speech:  normal  Language: Naming: intact; Word Finding: intact  Conversation no evidence of delusions  Behavior:  Cooperative  Mood: depressed and anxious  Affect: congruent with mood  Thought Content: no evidence of overt psychosis, delusional thought or suicidal /homicidal ideation or plan  Thought Process: goal directed  Judgement Insight:  improved and appropriate  Gait and Station:normal gait and station   Musculoskeletal: WNL      Assesment: No diagnosis found. Plan:  Start acamprosate 333 mg PO BID  Discontinue zoloft  Start Trintellix 5 mg (samples x 4 given)  1. The risks, benefits, side effects, indications, contraindications, and adverse effects of the medications have been discussed. Yes.  2. The pt has verbalized understanding and has capacity to give informed consent. 3. The Taylor Adkins report has been reviewed according to [facility] regulations. 4. Supportive therapy offered. 5. Follow up: Return in about 4 weeks (around 3/15/2018). 6. The patient has been advised to call with any problems. 7. Controlled substance Treatment Plan.   8. The above listed medications have been continued, modifications in meds and other orders/labs as follows:      Orders Placed This Encounter   Medications    acamprosate (CAMPRAL) 333 MG tablet     Sig: Take 1 tablet twice daily     Dispense:  90 tablet     Refill:  3      No orders of the defined types were placed in this encounter.       9. Additional comments:      Sara Ferreira, PMHNP-BC

## 2018-04-16 ENCOUNTER — OFFICE VISIT (OUTPATIENT)
Dept: FAMILY MEDICINE CLINIC | Facility: CLINIC | Age: 38
End: 2018-04-16

## 2018-04-16 VITALS
RESPIRATION RATE: 18 BRPM | BODY MASS INDEX: 27.83 KG/M2 | SYSTOLIC BLOOD PRESSURE: 126 MMHG | WEIGHT: 163 LBS | DIASTOLIC BLOOD PRESSURE: 60 MMHG | OXYGEN SATURATION: 99 % | TEMPERATURE: 98.6 F | HEIGHT: 64 IN | HEART RATE: 86 BPM

## 2018-04-16 DIAGNOSIS — G89.29 CHRONIC NECK PAIN: ICD-10-CM

## 2018-04-16 DIAGNOSIS — M54.5 CHRONIC LOW BACK PAIN, UNSPECIFIED BACK PAIN LATERALITY, WITH SCIATICA PRESENCE UNSPECIFIED: Chronic | ICD-10-CM

## 2018-04-16 DIAGNOSIS — K75.9 HEPATITIS: ICD-10-CM

## 2018-04-16 DIAGNOSIS — D75.1 ERYTHROCYTOSIS: Chronic | ICD-10-CM

## 2018-04-16 DIAGNOSIS — K76.0 FATTY LIVER: ICD-10-CM

## 2018-04-16 DIAGNOSIS — F41.9 ANXIETY: Chronic | ICD-10-CM

## 2018-04-16 DIAGNOSIS — R25.1 TREMOR: ICD-10-CM

## 2018-04-16 DIAGNOSIS — E78.5 HYPERLIPIDEMIA, UNSPECIFIED HYPERLIPIDEMIA TYPE: Chronic | ICD-10-CM

## 2018-04-16 DIAGNOSIS — F32.A DEPRESSION, UNSPECIFIED DEPRESSION TYPE: Chronic | ICD-10-CM

## 2018-04-16 DIAGNOSIS — G89.29 CHRONIC LOW BACK PAIN, UNSPECIFIED BACK PAIN LATERALITY, WITH SCIATICA PRESENCE UNSPECIFIED: Chronic | ICD-10-CM

## 2018-04-16 DIAGNOSIS — Z79.01 ANTICOAGULATED: Primary | ICD-10-CM

## 2018-04-16 DIAGNOSIS — M54.2 CHRONIC NECK PAIN: ICD-10-CM

## 2018-04-16 PROCEDURE — 99214 OFFICE O/P EST MOD 30 MIN: CPT | Performed by: FAMILY MEDICINE

## 2018-04-16 NOTE — PROGRESS NOTES
Subjective   Andi Curran is a 37 y.o. male presenting with chief complaint of:   Chief Complaint   Patient presents with   • Neck Pain     wants referred to Ortho    • Skin Lesion   • ADD   • Anxiety   • Depression   • Hyperlipidemia   • Fatty Liver     History of Present Illness :  With girlfriend.  Here for primarily an acute issue today; fu last visit.  Still having neck/back pain and has been sober since last visit; around 3m.   Wants to see ortho now.  Pain rest/worse activity.  No recent injury/accident linked.  Associated symptoms no pain into arm/legs significant.   Has multiple chronic problems to consider that might have a bearing on today's issues; not an interval appointment.       Other chronic problem/s to consider:   HTN.  The HTN has been present for years/it is chronic.  The HTN is assumed essential/without testing needed to look for other.  The HTN is controlled manifest by todays blood pressure and no home monitoring.  Associated illness below.   Anticoagulation: Requires anticoagulation with ASA 81 for erythrocytosis; has stopped it.   This needs to be continually monitored for risk/benefit.   Chronic neck pain:  The pain has been present for years/over a year.   It is chronic.   The pain is stable. 2-3 /10 pain most of time and usually is worse after activities.  The pain limits activities.  The problem has been evaulated and the patient has desire to see ortho institute as not getting better.  Chronic back pain:  The pain has been present for years/over a year.   It is chronic.   The pain is stable.  3-4 /10 pain most of time and usually is worse after activities.  The pain limits activities.  The problem has been evaulated and the patient has same as neck  Hyperlipidemia: This has been present for years/over a year.  It is chronic.   It is generally not controlled. .  Labs are needed periodic to monitor condition/safetly.   Rx therapy considered; but with his past drinking always difficult to  deal with.  Fatty liver: This was discovered years ago/it is chronic.  This problem is asymptomatic for pain, nausea.  Previously advised regular labs to follow this and there can be connection with cirrhosis/life threating liver disease.  Advised often helpful to normalize weight.   Has been hard to consider with his past alcoholism.  Alcoholism: chronic/for years.   The problem is much better; sober 3m.  Working with mental health.   Tremor: chronic/for years.   The problem is better; as not drinking.   Hepatitis: chronic/for years.   The problem has been variable.  Linked more so alcohol but ? Fatty liver.  .     Has an/another acute issue today: none.    The following portions of the patient's history were reviewed and updated as appropriate: allergies, current medications, past family history, past medical history, past social history, past surgical history and problem list.  Records acquired and reviewed; TCC migrated.      Current Outpatient Prescriptions:   •  amLODIPine (NORVASC) 5 MG tablet, Take 1 tablet by mouth Daily., Disp: 30 tablet, Rfl: 5  •  chlordiazePOXIDE (LIBRIUM) 25 MG capsule, TAKE 1 CAPSULE THREE TIMES DAILY AS NEEDED ANXIETY GENERIC FOR LIBRIUM, Disp: 30 capsule, Rfl: 0  •  omeprazole (priLOSEC) 20 MG capsule, Take 1 capsule by mouth Daily., Disp: 30 capsule, Rfl: 5  •  propranolol (INDERAL) 20 MG tablet, Take 1 tablet by mouth 3 (Three) Times a Day., Disp: 90 tablet, Rfl: 5    No problems with medications; but he wonders how long to stay on.  Refills if needed done    Allergies   Allergen Reactions   • Primidone       -        Review of Systems  GENERAL:  Active/slower with limits, speed, stamina for age and options; out of work and considering disability. Sleep is ok. No fever now.  SKIN: No rash/skin lesion of concern:   ENDO:  No syncope, near or diaphoretic sweaty spells..  HEENT: No recent head injury;  headache,  No vision change, No hearing loss.  Ears without pain/drainage.  No  sore throat.  No nasal/sinus congestion/drainage. No epistaxis.  CHEST: No chest wall tenderness or mass. No  significant cough,  without wheeze.  No SOB; no hemoptysis.  CV: No chest pain, palpitations, ankle edema.  GI: No heartburn, dysphagia.  No abdominal pain, diarrhea, constipation.  No rectal bleeding, or melena.    :  Voids without dysuria, or  incontinence to completion.  ORTHO: No painful/swollen joints but various on /off sore.  No change daily sore neck or back.  No acute neck or back pain without recent injury.  NEURO: No dizziness, weakness of extremities.  No UE/LE numbness/paresthesias.   PSYCH: ? mild memory loss.  Mood good; occ anxious, depressed but/and not suicidal.  Tries to tolerate stress .     Results for orders placed or performed in visit on 03/21/17   Sedimentation rate, automated   Result Value Ref Range    Sed Rate 13 0 - 15 mm/hr   C-reactive protein   Result Value Ref Range    C-Reactive Protein 4.2 0.0 - 4.9 mg/L   Uric acid   Result Value Ref Range    Uric Acid 4.6 3.7 - 8.6 mg/dL   YUNG   Result Value Ref Range    YUNG Direct Negative Negative   Comprehensive metabolic panel   Result Value Ref Range    Glucose 134 (H) 65 - 99 mg/dL    BUN 7 6 - 20 mg/dL    Creatinine 0.74 (L) 0.76 - 1.27 mg/dL    eGFR Non African Am 118 >59 mL/min/1.73    eGFR African Am 136 >59 mL/min/1.73    BUN/Creatinine Ratio 9 8 - 19    Sodium 140 134 - 144 mmol/L    Potassium 4.0 3.5 - 5.2 mmol/L    Chloride 95 (L) 96 - 106 mmol/L    Total CO2 21 18 - 29 mmol/L    Calcium 9.5 8.7 - 10.2 mg/dL    Total Protein 8.1 6.0 - 8.5 g/dL    Albumin 5.2 3.5 - 5.5 g/dL    Globulin 2.9 1.5 - 4.5 g/dL    A/G Ratio 1.8 1.2 - 2.2    Total Bilirubin 0.7 0.0 - 1.2 mg/dL    Alkaline Phosphatase 69 39 - 117 IU/L    AST (SGOT) 269 (H) 0 - 40 IU/L    ALT (SGPT) 221 (H) 0 - 44 IU/L   RHEUMATOID FACTOR   Result Value Ref Range    RA Latex Turbid <10.0 0.0 - 13.9 IU/mL   CBC & Differential   Result Value Ref Range    WBC 7.3 3.4 -  "10.8 x10E3/uL    RBC 5.10 4.14 - 5.80 x10E6/uL    Hemoglobin 16.7 12.6 - 17.7 g/dL    Hematocrit 47.9 37.5 - 51.0 %    MCV 94 79 - 97 fL    MCH 32.7 26.6 - 33.0 pg    MCHC 34.9 31.5 - 35.7 g/dL    RDW 14.3 12.3 - 15.4 %    Platelets 191 150 - 379 x10E3/uL    Neutrophil Rel % 55 %    Lymphocyte Rel % 27 %    Monocyte Rel % 15 %    Eosinophil Rel % 3 %    Basophil Rel % 0 %    Neutrophils Absolute 4.1 1.4 - 7.0 x10E3/uL    Lymphocytes Absolute 2.0 0.7 - 3.1 x10E3/uL    Monocytes Absolute 1.1 (H) 0.1 - 0.9 x10E3/uL    Eosinophils Absolute 0.2 0.0 - 0.4 x10E3/uL    Basophils Absolute 0.0 0.0 - 0.2 x10E3/uL    Immature Granulocyte Rel % 0 %    Immature Grans Absolute 0.0 0.0 - 0.1 x10E3/uL       No results found for: PSA     Lab Results:  CBC:    Lab Results - Last 18 Months  Lab Units 03/21/17  1315 11/10/16  0924 10/28/16  1352   WBC x10E3/uL 7.3 9.8 7.7   HEMOGLOBIN g/dL 16.7 17.7 17.3   HEMATOCRIT % 47.9 50.4 49.4   PLATELETS x10E3/uL 191 229 231      BMP/CMP:    Lab Results - Last 18 Months  Lab Units 03/21/17  1315 10/28/16  1352   SODIUM mmol/L 140 140   POTASSIUM mmol/L 4.0 4.4   CHLORIDE mmol/L 95* 96*   TOTAL CO2, ARTERIAL mmol/L 21 23   GLUCOSE mg/dL 134* 88   BUN mg/dL 7 5*   CREATININE mg/dL 0.74* 0.86   EGFR IF NONAFRICN AM mL/min/1.73 118 111   EGFR IF AFRICN AM mL/min/1.73 136 128   CALCIUM mg/dL 9.5 9.6     HEPATIC:    Lab Results - Last 18 Months  Lab Units 03/21/17  1315 10/28/16  1352   ALT (SGPT) IU/L 221* 47*   AST (SGOT) IU/L 269* 46*   ALK PHOS IU/L 69 73     THYROID:No results for input(s): TSH, T3FREE, FREET4, FTI in the last 49355 hours.    Invalid input(s): T3, T4, TEUP  A1C:No results for input(s): HGBA1C in the last 14251 hours.  PSA:No results for input(s): PSA in the last 50722 hours.    Objective   /60   Pulse 86   Temp 98.6 °F (37 °C) (Oral)   Resp 18   Ht 162.6 cm (64\")   Wt 73.9 kg (163 lb)   SpO2 99%   BMI 27.98 kg/m²   Body mass index is 27.98 kg/m².    Physical " Exam  GENERAL:  Well nourished/developed in no acute distress.   SKIN: Turgor excellent, without wound, rash, lesion   HEENT: Normal cephalic without trauma.  Pupils equal round reactive to light. Extraocular motions full without nystagmus.   External canals nonobstructive nontender without reddness. Tymphatic membranes sheri with alex structures intact.   Oral cavity without growths, exudates, and moist.  Posterior pharynx without mass, obstruction, redness.  No thyromegaly, mass, tenderness, lymphadenopathy and supple.  CV: Regular rhythm.  No murmur, gallop,  edema. Posterior pulses intact.  No carotid bruits.  CHEST: No chest wall tenderness or mass.   LUNGS: Symmetric motion with clear to auscultation.  ABD: Soft, nontender without mass.   ORTHO: Symmetric extremities without swelling/point tenderness.  Full gross range of motion.  NEURO: CN 2-12 grossly intact.  Symmetric facies and UE/LE. 4/5 strength throughout. 1/4 x bicep knee equal reflexes.  Nonfocal use extremities. Speech clear. Intact light touch with monofilament, vibratory sensation with tuning fork; equal toes/distal feet.    PSYCH: Oriented x 3.  Pleasant calm, well kept.   Purposeful/directed conservation with intact short/long gross memory.     Assessment/Plan     1. Anticoagulated-Dr BELCHER previous-erythrocytosis/ASA 81    2. Anxiety    3. Depression, unspecified depression type    4. Fatty liver    5. Hepatitis-only proof alcohol.     6. Hyperlipidemia, unspecified hyperlipidemia type    7. Tremor-alcohol    8. Erythrocytosis-polycythemia vera-smoker    9. Chronic low back pain, unspecified back pain laterality, with sciatica presence unspecified    10. Chronic neck pain        Rx: reviewed/changes:  Same    LAB/Testing/Referrals: reviewed/orders:   Today:   Orders Placed This Encounter   Procedures   • TSH   • Comprehensive Metabolic Panel   • Ambulatory Referral to Orthopedic Surgery   • CBC & Differential     Usual:   3m CBC, CMP  12m CBC,  CMP, LIPID, TSH, Vit D    Discussions:   Body mass index is 27.98 kg/m².   Patient's Body mass index is 27.98 kg/m². BMI is within normal parameters. No follow-up required.  I advised the patient of the risks in continuing to use tobacco, and I provided this patient with smoking cessation educational materials.    During this visit, I spent > 3-10 minutes counseling the patient regarding smoking cessation.      There are no Patient Instructions on file for this visit.    Follow up: Return for lab today;, Dr Alexander-4m.  Future Appointments  Date Time Provider Department Center   8/30/2018 11:00 AM Moose Alexander MD [facility] None

## 2018-04-17 LAB
ALBUMIN SERPL-MCNC: 4.8 G/DL (ref 3.5–5.5)
ALBUMIN/GLOB SERPL: 1.9 {RATIO} (ref 1.2–2.2)
ALP SERPL-CCNC: 58 IU/L (ref 39–117)
ALT SERPL-CCNC: 13 IU/L (ref 0–44)
AST SERPL-CCNC: 20 IU/L (ref 0–40)
BASOPHILS # BLD AUTO: 0 X10E3/UL (ref 0–0.2)
BASOPHILS NFR BLD AUTO: 0 %
BILIRUB SERPL-MCNC: 0.4 MG/DL (ref 0–1.2)
BUN SERPL-MCNC: 9 MG/DL (ref 6–20)
BUN/CREAT SERPL: 13 (ref 9–20)
CALCIUM SERPL-MCNC: 10.1 MG/DL (ref 8.7–10.2)
CHLORIDE SERPL-SCNC: 96 MMOL/L (ref 96–106)
CO2 SERPL-SCNC: 26 MMOL/L (ref 18–29)
CREAT SERPL-MCNC: 0.71 MG/DL (ref 0.76–1.27)
EOSINOPHIL # BLD AUTO: 0.1 X10E3/UL (ref 0–0.4)
EOSINOPHIL NFR BLD AUTO: 2 %
ERYTHROCYTE [DISTWIDTH] IN BLOOD BY AUTOMATED COUNT: 12.9 % (ref 12.3–15.4)
GFR SERPLBLD CREATININE-BSD FMLA CKD-EPI: 119 ML/MIN/1.73
GFR SERPLBLD CREATININE-BSD FMLA CKD-EPI: 138 ML/MIN/1.73
GLOBULIN SER CALC-MCNC: 2.5 G/DL (ref 1.5–4.5)
GLUCOSE SERPL-MCNC: 86 MG/DL (ref 65–99)
HCT VFR BLD AUTO: 44.3 % (ref 37.5–51)
HGB BLD-MCNC: 15.4 G/DL (ref 13–17.7)
IMM GRANULOCYTES # BLD: 0 X10E3/UL (ref 0–0.1)
IMM GRANULOCYTES NFR BLD: 0 %
LYMPHOCYTES # BLD AUTO: 1.9 X10E3/UL (ref 0.7–3.1)
LYMPHOCYTES NFR BLD AUTO: 25 %
MCH RBC QN AUTO: 32.1 PG (ref 26.6–33)
MCHC RBC AUTO-ENTMCNC: 34.8 G/DL (ref 31.5–35.7)
MCV RBC AUTO: 92 FL (ref 79–97)
MONOCYTES # BLD AUTO: 0.8 X10E3/UL (ref 0.1–0.9)
MONOCYTES NFR BLD AUTO: 11 %
NEUTROPHILS # BLD AUTO: 5 X10E3/UL (ref 1.4–7)
NEUTROPHILS NFR BLD AUTO: 62 %
PLATELET # BLD AUTO: 257 X10E3/UL (ref 150–379)
POTASSIUM SERPL-SCNC: 4.6 MMOL/L (ref 3.5–5.2)
PROT SERPL-MCNC: 7.3 G/DL (ref 6–8.5)
RBC # BLD AUTO: 4.8 X10E6/UL (ref 4.14–5.8)
SODIUM SERPL-SCNC: 140 MMOL/L (ref 134–144)
TSH SERPL DL<=0.005 MIU/L-ACNC: 1.23 UIU/ML (ref 0.45–4.5)
WBC # BLD AUTO: 7.9 X10E3/UL (ref 3.4–10.8)

## 2018-04-18 ENCOUNTER — OFFICE VISIT (OUTPATIENT)
Dept: FAMILY MEDICINE CLINIC | Facility: CLINIC | Age: 38
End: 2018-04-18

## 2018-04-18 VITALS
WEIGHT: 163 LBS | HEIGHT: 64 IN | SYSTOLIC BLOOD PRESSURE: 130 MMHG | DIASTOLIC BLOOD PRESSURE: 80 MMHG | TEMPERATURE: 98 F | BODY MASS INDEX: 27.83 KG/M2 | RESPIRATION RATE: 18 BRPM | OXYGEN SATURATION: 99 %

## 2018-04-18 DIAGNOSIS — K75.9 HEPATITIS: Primary | ICD-10-CM

## 2018-04-18 DIAGNOSIS — L98.9 SKIN LESION: ICD-10-CM

## 2018-04-18 PROCEDURE — 99213 OFFICE O/P EST LOW 20 MIN: CPT | Performed by: FAMILY MEDICINE

## 2018-04-19 NOTE — PROGRESS NOTES
Subjective   Andi Curran is a 37 y.o. male presenting with chief complaint of:   Chief Complaint   Patient presents with   • Skin Lesion       History of Present Illness :  Alone.  Here for primarily an acute issue today; skin lesions.  Father  after having a cancer mole; and he there is sensitive to this.   Has multiple chronic problems to consider that might have a bearing on today's issues; not an interval appointment.       Other chronic problem/s to consider:  Alcoholism:  Has been sober 3m after years of drinking since adolescense.  Feels a lot better  Hepatitis:  Has only been proven to be alcohol related.  Recent labs done; we called him with back to normal labs.  He wanted more information today    Has an/another acute issue today: none.    The following portions of the patient's history were reviewed and updated as appropriate: allergies, current medications, past family history, past medical history, past social history, past surgical history and problem list.      Current Outpatient Prescriptions:   •  amLODIPine (NORVASC) 5 MG tablet, Take 1 tablet by mouth Daily., Disp: 30 tablet, Rfl: 5  •  chlordiazePOXIDE (LIBRIUM) 25 MG capsule, TAKE 1 CAPSULE THREE TIMES DAILY AS NEEDED ANXIETY GENERIC FOR LIBRIUM, Disp: 30 capsule, Rfl: 0  •  omeprazole (priLOSEC) 20 MG capsule, Take 1 capsule by mouth Daily., Disp: 30 capsule, Rfl: 5  •  propranolol (INDERAL) 20 MG tablet, Take 1 tablet by mouth 3 (Three) Times a Day., Disp: 90 tablet, Rfl: 5    No problems with medications.  Refills if needed done    Allergies   Allergen Reactions   • Primidone       -        Review of Systems  GENERAL:  Active without limits. Sleep is ok. No fever now.  SKIN: No other rash/skin lesion of concern:  Beyond those PHOTO/mentioned  ENDO:  No syncope, near or diaphoretic sweaty spells.  GI: No heartburn, dysphagia.  No abdominal pain, diarrhea, constipation.  No rectal bleeding, or melena.    PSYCH: No memory loss.  Mood good;  not that anxious, depressed but/and not suicidal.  Tries to tolerate stress .     Results for orders placed or performed in visit on 04/16/18   TSH   Result Value Ref Range    TSH 1.230 0.450 - 4.500 uIU/mL   Comprehensive Metabolic Panel   Result Value Ref Range    Glucose 86 65 - 99 mg/dL    BUN 9 6 - 20 mg/dL    Creatinine 0.71 (L) 0.76 - 1.27 mg/dL    eGFR Non African Am 119 >59 mL/min/1.73    eGFR African Am 138 >59 mL/min/1.73    BUN/Creatinine Ratio 13 9 - 20    Sodium 140 134 - 144 mmol/L    Potassium 4.6 3.5 - 5.2 mmol/L    Chloride 96 96 - 106 mmol/L    Total CO2 26 18 - 29 mmol/L    Calcium 10.1 8.7 - 10.2 mg/dL    Total Protein 7.3 6.0 - 8.5 g/dL    Albumin 4.8 3.5 - 5.5 g/dL    Globulin 2.5 1.5 - 4.5 g/dL    A/G Ratio 1.9 1.2 - 2.2    Total Bilirubin 0.4 0.0 - 1.2 mg/dL    Alkaline Phosphatase 58 39 - 117 IU/L    AST (SGOT) 20 0 - 40 IU/L    ALT (SGPT) 13 0 - 44 IU/L   CBC & Differential   Result Value Ref Range    WBC 7.9 3.4 - 10.8 x10E3/uL    RBC 4.80 4.14 - 5.80 x10E6/uL    Hemoglobin 15.4 13.0 - 17.7 g/dL    Hematocrit 44.3 37.5 - 51.0 %    MCV 92 79 - 97 fL    MCH 32.1 26.6 - 33.0 pg    MCHC 34.8 31.5 - 35.7 g/dL    RDW 12.9 12.3 - 15.4 %    Platelets 257 150 - 379 x10E3/uL    Neutrophil Rel % 62 Not Estab. %    Lymphocyte Rel % 25 Not Estab. %    Monocyte Rel % 11 Not Estab. %    Eosinophil Rel % 2 Not Estab. %    Basophil Rel % 0 Not Estab. %    Neutrophils Absolute 5.0 1.4 - 7.0 x10E3/uL    Lymphocytes Absolute 1.9 0.7 - 3.1 x10E3/uL    Monocytes Absolute 0.8 0.1 - 0.9 x10E3/uL    Eosinophils Absolute 0.1 0.0 - 0.4 x10E3/uL    Basophils Absolute 0.0 0.0 - 0.2 x10E3/uL    Immature Granulocyte Rel % 0 Not Estab. %    Immature Grans Absolute 0.0 0.0 - 0.1 x10E3/uL       No results found for: PSA     Lab Results:  CBC:    Lab Results - Last 18 Months  Lab Units 04/16/18  1227 03/21/17  1315 11/10/16  0924 10/28/16  1352   WBC x10E3/uL 7.9 7.3 9.8 7.7   HEMOGLOBIN g/dL 15.4 16.7 17.7 17.3  "  HEMATOCRIT % 44.3 47.9 50.4 49.4   PLATELETS x10E3/uL 257 191 229 231      BMP/CMP:    Lab Results - Last 18 Months  Lab Units 04/16/18  1227 03/21/17  1315 10/28/16  1352   SODIUM mmol/L 140 140 140   POTASSIUM mmol/L 4.6 4.0 4.4   CHLORIDE mmol/L 96 95* 96*   TOTAL CO2, ARTERIAL mmol/L 26 21 23   GLUCOSE mg/dL 86 134* 88   BUN mg/dL 9 7 5*   CREATININE mg/dL 0.71* 0.74* 0.86   EGFR IF NONAFRICN AM mL/min/1.73 119 118 111   EGFR IF AFRICN AM mL/min/1.73 138 136 128   CALCIUM mg/dL 10.1 9.5 9.6     HEPATIC:    Lab Results - Last 18 Months  Lab Units 04/16/18  1227 03/21/17  1315 10/28/16  1352   ALT (SGPT) IU/L 13 221* 47*   AST (SGOT) IU/L 20 269* 46*   ALK PHOS IU/L 58 69 73     THYROID:    Lab Results - Last 18 Months  Lab Units 04/16/18  1227   TSH uIU/mL 1.230     A1C:No results for input(s): HGBA1C in the last 91704 hours.  PSA:No results for input(s): PSA in the last 15147 hours.    Objective   /80   Temp 98 °F (36.7 °C) (Oral)   Resp 18   Ht 162.6 cm (64\")   Wt 73.9 kg (163 lb)   SpO2 99%   BMI 27.98 kg/m²   Body mass index is 27.98 kg/m².    Physical Exam  GENERAL:  Well nourished/developed in no acute distress.   SKIN: Turgor excellent, without wound, rash, lesion other than: PHOTO x 3; R scapular, R angle/mandible and L abdomen.  HEENT: Normal cephalic without trauma.  Pupils equal round reactive to light. Extraocular motions full without nystagmus.  No thyromegaly, mass, tenderness, lymphadenopathy and supple.   ABD: Soft, nontender without mass.   PSYCH: Oriented x 3.  Pleasant calm, well kept. Purposeful/directed conservation with intact short/long gross memory.     Assessment/Plan     1. Hepatitis-only proof alcohol.  Much better off alcohol   2. Skin lesion# mole lesions x 3; one maybe melenoma (scapula)?       Rx: reviewed/changes:  same    LAB/Testing/Referrals: reviewed/orders:   Today: none  No orders of the defined types were placed in this encounter.    Usual:   3m CBC, CMP  12m " CBC, CMP, LIPID, TSH, Vit D    Discussions:   Body mass index is 27.98 kg/m².   Patient's Body mass index is 27.98 kg/m². BMI is within normal parameters. No follow-up required.  Smoker; advised last visit    There are no Patient Instructions on file for this visit.    Follow up: Return for excision date next wednesday-30 min.  Future Appointments  Date Time Provider Department Center   4/25/2018 11:15 AM MD [facility] None   8/30/2018 11:00 AM MD [facility] None

## 2018-04-20 ENCOUNTER — OFFICE VISIT (OUTPATIENT)
Dept: PSYCHIATRY | Age: 38
End: 2018-04-20
Payer: MEDICAID

## 2018-04-20 VITALS
WEIGHT: 160.3 LBS | HEIGHT: 69 IN | BODY MASS INDEX: 23.74 KG/M2 | DIASTOLIC BLOOD PRESSURE: 70 MMHG | OXYGEN SATURATION: 100 % | HEART RATE: 77 BPM | SYSTOLIC BLOOD PRESSURE: 100 MMHG

## 2018-04-20 DIAGNOSIS — F10.10 ALCOHOL ABUSE: Primary | ICD-10-CM

## 2018-04-20 DIAGNOSIS — F41.1 GENERALIZED ANXIETY DISORDER: ICD-10-CM

## 2018-04-20 PROCEDURE — 99214 OFFICE O/P EST MOD 30 MIN: CPT | Performed by: NURSE PRACTITIONER

## 2018-04-20 RX ORDER — CHLORDIAZEPOXIDE HYDROCHLORIDE 25 MG/1
25 CAPSULE, GELATIN COATED ORAL PRN
COMMUNITY
Start: 2018-02-12

## 2018-04-25 ENCOUNTER — PROCEDURE VISIT (OUTPATIENT)
Dept: FAMILY MEDICINE CLINIC | Facility: CLINIC | Age: 38
End: 2018-04-25

## 2018-04-25 VITALS
RESPIRATION RATE: 20 BRPM | HEART RATE: 98 BPM | TEMPERATURE: 98.7 F | OXYGEN SATURATION: 98 % | SYSTOLIC BLOOD PRESSURE: 108 MMHG | BODY MASS INDEX: 27.66 KG/M2 | WEIGHT: 162 LBS | DIASTOLIC BLOOD PRESSURE: 78 MMHG | HEIGHT: 64 IN

## 2018-04-25 DIAGNOSIS — F10.939 ALCOHOL WITHDRAWAL SYNDROME WITH COMPLICATION (HCC): ICD-10-CM

## 2018-04-25 DIAGNOSIS — F41.9 ANXIETY: ICD-10-CM

## 2018-04-25 DIAGNOSIS — F10.20 ALCOHOLISM (HCC): ICD-10-CM

## 2018-04-25 DIAGNOSIS — L98.9 SKIN LESION: Primary | ICD-10-CM

## 2018-04-25 RX ORDER — CHLORDIAZEPOXIDE HYDROCHLORIDE 25 MG/1
25 CAPSULE, GELATIN COATED ORAL 3 TIMES DAILY PRN
Qty: 30 CAPSULE | Refills: 0 | Status: SHIPPED | OUTPATIENT
Start: 2018-04-25 | End: 2018-07-18 | Stop reason: SDUPTHER

## 2018-04-25 NOTE — PROGRESS NOTES
"Subjective   Andi Curran is a 37 y.o. male presenting with chief complaint of:   Chief Complaint   Patient presents with   • excision     \"few moles\"       History of Present Illness :  With girlfriend.    Has a derm lesions.   Has had this > 6m.   It is ? growing.  It is not sore.  Treatment is requested previous visits; previous photos.  Here for treatments.     Other chronic problem/s to consider:   Anxiety: This has been present for years/over a year.  It is chronic.  It is variable.  It is associated with stressors: and past use alcohol and withdrawal.  Medications being used help past; needs refills Medications/Rx change not requested.    Has an acute issue today:none    The following portions of the patient's history were reviewed and updated as appropriate: allergies, current medications, past family history, past medical history, past social history, past surgical history and problem list.      Current Outpatient Prescriptions:   •  amLODIPine (NORVASC) 5 MG tablet, Take 1 tablet by mouth Daily., Disp: 30 tablet, Rfl: 5  •  chlordiazePOXIDE (LIBRIUM) 25 MG capsule, Take 1 capsule by mouth 3 (Three) Times a Day As Needed for Anxiety., Disp: 30 capsule, Rfl: 0  •  omeprazole (priLOSEC) 20 MG capsule, Take 1 capsule by mouth Daily., Disp: 30 capsule, Rfl: 5  •  propranolol (INDERAL) 20 MG tablet, Take 1 tablet by mouth 3 (Three) Times a Day., Disp: 90 tablet, Rfl: 5    Allergies   Allergen Reactions   • Primidone       -        Review of Systems  GENERAL:  Active/slower with limits, speed, samni for age; limited desire/no job. Sleep is ok. No fever now.  CHEST: No chest wall tenderness or mass. No cough, without wheeze, SOB.  CV: No chest pain, palpatations, or ankle edema.   PSYCH: Not drinking; frequent anxious.     Results for orders placed or performed in visit on 04/16/18   TSH   Result Value Ref Range    TSH 1.230 0.450 - 4.500 uIU/mL   Comprehensive Metabolic Panel   Result Value Ref Range    Glucose 86 " 65 - 99 mg/dL    BUN 9 6 - 20 mg/dL    Creatinine 0.71 (L) 0.76 - 1.27 mg/dL    eGFR Non African Am 119 >59 mL/min/1.73    eGFR African Am 138 >59 mL/min/1.73    BUN/Creatinine Ratio 13 9 - 20    Sodium 140 134 - 144 mmol/L    Potassium 4.6 3.5 - 5.2 mmol/L    Chloride 96 96 - 106 mmol/L    Total CO2 26 18 - 29 mmol/L    Calcium 10.1 8.7 - 10.2 mg/dL    Total Protein 7.3 6.0 - 8.5 g/dL    Albumin 4.8 3.5 - 5.5 g/dL    Globulin 2.5 1.5 - 4.5 g/dL    A/G Ratio 1.9 1.2 - 2.2    Total Bilirubin 0.4 0.0 - 1.2 mg/dL    Alkaline Phosphatase 58 39 - 117 IU/L    AST (SGOT) 20 0 - 40 IU/L    ALT (SGPT) 13 0 - 44 IU/L   CBC & Differential   Result Value Ref Range    WBC 7.9 3.4 - 10.8 x10E3/uL    RBC 4.80 4.14 - 5.80 x10E6/uL    Hemoglobin 15.4 13.0 - 17.7 g/dL    Hematocrit 44.3 37.5 - 51.0 %    MCV 92 79 - 97 fL    MCH 32.1 26.6 - 33.0 pg    MCHC 34.8 31.5 - 35.7 g/dL    RDW 12.9 12.3 - 15.4 %    Platelets 257 150 - 379 x10E3/uL    Neutrophil Rel % 62 Not Estab. %    Lymphocyte Rel % 25 Not Estab. %    Monocyte Rel % 11 Not Estab. %    Eosinophil Rel % 2 Not Estab. %    Basophil Rel % 0 Not Estab. %    Neutrophils Absolute 5.0 1.4 - 7.0 x10E3/uL    Lymphocytes Absolute 1.9 0.7 - 3.1 x10E3/uL    Monocytes Absolute 0.8 0.1 - 0.9 x10E3/uL    Eosinophils Absolute 0.1 0.0 - 0.4 x10E3/uL    Basophils Absolute 0.0 0.0 - 0.2 x10E3/uL    Immature Granulocyte Rel % 0 Not Estab. %    Immature Grans Absolute 0.0 0.0 - 0.1 x10E3/uL       LABS  CBC:    Lab Results - Last 18 Months  Lab Units 04/16/18  1227 03/21/17  1315 11/10/16  0924 10/28/16  1352   WBC x10E3/uL 7.9 7.3 9.8 7.7   HEMATOCRIT % 44.3 47.9 50.4 49.4     CMP:    Lab Results - Last 18 Months  Lab Units 04/16/18  1227 03/21/17  1315 10/28/16  1352   SODIUM mmol/L 140 140 140   CHLORIDE mmol/L 96 95* 96*   TOTAL CO2, ARTERIAL mmol/L 26 21 23   BUN mg/dL 9 7 5*   CREATININE mg/dL 0.71* 0.74* 0.86   EGFR IF NONAFRICN AM mL/min/1.73 119 118 111   EGFR IF AFRICN AM mL/min/1.73 138  "136 128   CALCIUM mg/dL 10.1 9.5 9.6     HEPATIC:    Lab Results - Last 18 Months  Lab Units 04/16/18  1227 03/21/17  1315 10/28/16  1352   ALT (SGPT) IU/L 13 221* 47*     THYROID:    Lab Results - Last 18 Months  Lab Units 04/16/18  1227   TSH uIU/mL 1.230     A1C:No results for input(s): HGBA1C in the last 44309 hours.  PSA:@(psa:7)@      Objective   /78 (BP Location: Left arm, Patient Position: Sitting, Cuff Size: Large Adult)   Pulse 98   Temp 98.7 °F (37.1 °C) (Oral)   Resp 20   Ht 162.6 cm (64\")   Wt 73.5 kg (162 lb)   SpO2 98%   BMI 27.81 kg/m²     Physical Exam  GENERAL:  Well nourished/developed in no acute distress.  SKIN: Turgor excellent, without wound, rash, lesion.  PHOTO L mid/lateral chest   PHOTO R neck behind angle mandible  PHOTO-R posterior/high scapula  PHOTO-R mid scapula    CV: Regular rhythm.  No murmur, gallop,  edema. Posterior pulses intact.    LUNGS: Symmetric motion with clear to auscultation.  No dullness to percussion  PSYCH: Oriented x 3.  Pleasant calm, well kept.  Purposeful/directed conservation with intact short/long gross memory.     Assessment/Plan     1. Skin lesion#  L chest wall; benign nevi; path pending  2.5mm  R neck/angle mandible; hair follicle; path pending  2.5 mm  R high scapula; ? melenoma path pending  Lesion 7mm; punch edge 3mm  R mid scapula: SK? Path pending  Lesion 4-6mm  - Reference Histopathology    2. Alcoholism  Doing well  - chlordiazePOXIDE (LIBRIUM) 25 MG capsule; Take 1 capsule by mouth 3 (Three) Times a Day As Needed for Anxiety.  Dispense: 30 capsule; Refill: 0    3. Alcohol withdrawal syndrome with complication  - chlordiazePOXIDE (LIBRIUM) 25 MG capsule; Take 1 capsule by mouth 3 (Three) Times a Day As Needed for Anxiety.  Dispense: 30 capsule; Refill: 0    4. Anxiety  On/off  - chlordiazePOXIDE (LIBRIUM) 25 MG capsule; Take 1 capsule by mouth 3 (Three) Times a Day As Needed for Anxiety.  Dispense: 30 capsule; Refill: 0    Punch: L chest " wall, R neck, ? R melenoma/high R scapula  Verbal permission was given for procedure as done/described below.  Through this also warned of pain, bleeding, infection, more surgery/expense, and regrowth.   No questions left to answer.   Cleansed the lesions with betadine  Injected xylocaine with epi 1%/1 cc each lesion.   Punch Bx 3 mm performed and specimen placed in container-L chest, R neck, R ? Melenoma.   Hyfrecation of remaining lesion and scissors used to debride to clean base with 3 hyfrecation/debridement processes completed  One final hyfrecation with hemostasis  Surgical wound cleansed with peroxide.  Wound bandaged as needed.   Asked to clean with clean water/soap at least twice a day and dry well; bandage until dry base or if any excessive dirty work/exposure entertained until healed over.     R mid scapula lesion  Verbal permission given for procedure described as done with warning of pain, bleeding, infection, more surgery/expense and regrowth; all possible. Accepted  Cleansed the lesion/surrounding area with betadine wipes.   Lidocaine 1%/plain with epi 1 cc infiltrated; and confirmed anesthesia/no pain perceived  # 15 blade; outlined  lesion and deeply shaved into the subdermal tissue.   Lesion base/edges was hyfricated repeated until no bleeding and noting some wound closure.  The wound was then cleansed with peroxide.  Triple antibiotic/equal was applied and bandage applied after that.    Asked to repeat this cleanse procedure as needed/at least twice a day until healed and report any signs of problems such as bleeding, pain, fever, nonhealing and regrowth in the future.    path report pending; patient was asked to call if they do not receive verbal confirmation of the path report usually returning 7-10 days.       Patient Instructions   Please cleanse your treatment site/wound with soap and clean water 2-3 times a day or as needed.  Please report signs of infection such as reddness, increasing  pain, drainage, splitting or obvious problems now or regrowth in the future.  Try to keep the area dry...showering is ok if brief and dry well when done. If drainage or the wound/site is going to be exposed to soilage; keep covered with bandage/bandaid as needed. Be aware there was a specimum/pathology taken today;  be sure and call us for results if not called by us in 7-10 days    If any skin lesion  Grows in size; especially greater than pencil eraser  Changes in color  Becomes ulcerated/bleeds  Itches/hurts  Or changes; let us know        Follow up: Return for lab;, Dr Alexander-, as planned;.

## 2018-04-25 NOTE — PATIENT INSTRUCTIONS
Please cleanse your treatment site/wound with soap and clean water 2-3 times a day or as needed.  Please report signs of infection such as reddness, increasing pain, drainage, splitting or obvious problems now or regrowth in the future.  Try to keep the area dry...showering is ok if brief and dry well when done. If drainage or the wound/site is going to be exposed to soilage; keep covered with bandage/bandaid as needed. Be aware there was a specimum/pathology taken today;  be sure and call us for results if not called by us in 7-10 days    If any skin lesion  Grows in size; especially greater than pencil eraser  Changes in color  Becomes ulcerated/bleeds  Itches/hurts  Or changes; let us know

## 2018-04-30 LAB
DX ICD CODE: NORMAL
DX ICD CODE: NORMAL
PATH REPORT.FINAL DX SPEC: NORMAL
PATH REPORT.GROSS SPEC: NORMAL
PATH REPORT.SITE OF ORIGIN SPEC: NORMAL
PATHOLOGIST NAME: NORMAL
PAYMENT PROCEDURE: NORMAL

## 2018-05-01 DIAGNOSIS — L98.9 SKIN LESION: Primary | ICD-10-CM

## 2018-05-08 ENCOUNTER — TELEPHONE (OUTPATIENT)
Dept: FAMILY MEDICINE CLINIC | Facility: CLINIC | Age: 38
End: 2018-05-08

## 2018-05-08 DIAGNOSIS — G89.29 CHRONIC NECK PAIN: ICD-10-CM

## 2018-05-08 DIAGNOSIS — M54.2 CHRONIC NECK PAIN: ICD-10-CM

## 2018-05-08 DIAGNOSIS — G89.29 CHRONIC LOW BACK PAIN, UNSPECIFIED BACK PAIN LATERALITY, WITH SCIATICA PRESENCE UNSPECIFIED: Primary | Chronic | ICD-10-CM

## 2018-05-08 DIAGNOSIS — M54.5 CHRONIC LOW BACK PAIN, UNSPECIFIED BACK PAIN LATERALITY, WITH SCIATICA PRESENCE UNSPECIFIED: Primary | Chronic | ICD-10-CM

## 2018-05-17 ENCOUNTER — HOSPITAL ENCOUNTER (OUTPATIENT)
Dept: HOSPITAL 58 - RAD | Age: 38
Discharge: HOME | End: 2018-05-17
Attending: FAMILY MEDICINE

## 2018-05-17 VITALS — BODY MASS INDEX: 22.4 KG/M2

## 2018-05-17 DIAGNOSIS — G89.29: ICD-10-CM

## 2018-05-17 DIAGNOSIS — M54.2: Primary | ICD-10-CM

## 2018-05-18 ENCOUNTER — HOSPITAL ENCOUNTER (OUTPATIENT)
Dept: HOSPITAL 58 - RAD | Age: 38
Discharge: HOME | End: 2018-05-18
Attending: FAMILY MEDICINE

## 2018-05-18 VITALS — BODY MASS INDEX: 22.4 KG/M2

## 2018-05-18 DIAGNOSIS — G89.29: ICD-10-CM

## 2018-05-18 DIAGNOSIS — M54.5: Primary | ICD-10-CM

## 2018-05-21 ENCOUNTER — TELEPHONE (OUTPATIENT)
Dept: FAMILY MEDICINE CLINIC | Facility: CLINIC | Age: 38
End: 2018-05-21

## 2018-05-21 DIAGNOSIS — G89.29 CHRONIC NECK PAIN: ICD-10-CM

## 2018-05-21 DIAGNOSIS — G89.29 CHRONIC LOW BACK PAIN, UNSPECIFIED BACK PAIN LATERALITY, WITH SCIATICA PRESENCE UNSPECIFIED: Chronic | ICD-10-CM

## 2018-05-21 DIAGNOSIS — M54.5 CHRONIC LOW BACK PAIN, UNSPECIFIED BACK PAIN LATERALITY, WITH SCIATICA PRESENCE UNSPECIFIED: Chronic | ICD-10-CM

## 2018-05-21 DIAGNOSIS — M54.2 CHRONIC NECK PAIN: ICD-10-CM

## 2018-05-21 NOTE — TELEPHONE ENCOUNTER
----- Message from Moose Alexander MD sent at 5/21/2018  1:42 PM CDT -----  Same as for back; on to neurosurgery

## 2018-05-21 NOTE — TELEPHONE ENCOUNTER
Clarification needed. On the comments you put in for lumbar, you mentioned him needing tests for back doctor (which is in process and we have the order already). On this cervical you've added a neurosurgeon. Please clarify: continue with ortho surgeon or add neuro surgeon as well? I have to get authorizations and was prepared for the original referral to Dr. Urbina in [place] who takes his insurance.If you want neuro surgeon too, I'll need an order and will have to apply to insurance for authorization if a doctor can be found that takes his insurance. Please advise. Thanks

## 2018-06-05 ENCOUNTER — TELEPHONE (OUTPATIENT)
Dept: FAMILY MEDICINE CLINIC | Facility: CLINIC | Age: 38
End: 2018-06-05

## 2018-06-05 NOTE — TELEPHONE ENCOUNTER
"Patient had brought in a paper regarding an appointment for job placement that was scheduled for 6.5.18. He rescheduled for 6.7.18 and originally wanted a letter from MD stating he was unable to work and to be excused from the meeting. Stated that he may lose benefits/aid if he cannot attend without some type of excuse. He called today and asked about it. I explained that we usually do not do letters of this type. We talked and I told him I'd ask if we could do a letter that states that \"the status of his ability to work is unknown at present'. He said he'd be content with that. I told him I'd have to ask MD if willing.   "

## 2018-06-27 PROBLEM — M19.90 ARTHRITIS: Status: ACTIVE | Noted: 2018-06-27

## 2018-07-17 ENCOUNTER — TELEPHONE (OUTPATIENT)
Dept: PSYCHIATRY | Age: 38
End: 2018-07-17

## 2018-07-17 NOTE — TELEPHONE ENCOUNTER
I called pt to confirm apt for tomorrow, pt states he can't afford to come and was wondering if you knew any psychiatrist's in the [facility] area so it would be closer for him? Pt is asking that you give him a call when you can. Thanks.

## 2018-07-18 DIAGNOSIS — F41.9 ANXIETY: ICD-10-CM

## 2018-07-18 DIAGNOSIS — F10.939 ALCOHOL WITHDRAWAL SYNDROME WITH COMPLICATION (HCC): ICD-10-CM

## 2018-07-18 DIAGNOSIS — F10.20 ALCOHOLISM (HCC): ICD-10-CM

## 2018-07-18 RX ORDER — CHLORDIAZEPOXIDE HYDROCHLORIDE 25 MG/1
25 CAPSULE, GELATIN COATED ORAL 3 TIMES DAILY PRN
Qty: 30 CAPSULE | Refills: 0 | Status: SHIPPED | OUTPATIENT
Start: 2018-07-18

## 2018-08-02 ENCOUNTER — TELEPHONE (OUTPATIENT)
Dept: FAMILY MEDICINE CLINIC | Facility: CLINIC | Age: 38
End: 2018-08-02

## 2018-08-02 DIAGNOSIS — G89.29 CHRONIC NECK PAIN: ICD-10-CM

## 2018-08-02 DIAGNOSIS — M54.2 CHRONIC NECK PAIN: ICD-10-CM

## 2018-08-02 DIAGNOSIS — G89.29 CHRONIC LOW BACK PAIN, UNSPECIFIED BACK PAIN LATERALITY, WITH SCIATICA PRESENCE UNSPECIFIED: Primary | Chronic | ICD-10-CM

## 2018-08-02 DIAGNOSIS — M54.5 CHRONIC LOW BACK PAIN, UNSPECIFIED BACK PAIN LATERALITY, WITH SCIATICA PRESENCE UNSPECIFIED: Primary | Chronic | ICD-10-CM

## 2018-08-02 NOTE — TELEPHONE ENCOUNTER
We have been unable to get pt in with an ortho due to insurance The only one in the area that accepts his sent us a letter stating there was nothing they could do for him.  Pt has requested a referral to Pain Management.

## 2018-08-29 ENCOUNTER — HOSPITAL ENCOUNTER (OUTPATIENT)
Dept: HOSPITAL 58 - RHC-LAB | Age: 38
Discharge: HOME | End: 2018-08-29
Attending: INTERNAL MEDICINE

## 2018-08-29 VITALS — BODY MASS INDEX: 22.4 KG/M2

## 2018-08-29 DIAGNOSIS — G25.0: ICD-10-CM

## 2018-08-29 DIAGNOSIS — M19.90: ICD-10-CM

## 2018-08-29 DIAGNOSIS — K21.9: ICD-10-CM

## 2018-08-29 DIAGNOSIS — M47.26: Primary | ICD-10-CM

## 2018-08-29 DIAGNOSIS — I10: ICD-10-CM

## 2018-08-29 PROCEDURE — 36415 COLL VENOUS BLD VENIPUNCTURE: CPT

## 2018-08-29 PROCEDURE — 80053 COMPREHEN METABOLIC PANEL: CPT

## 2018-08-29 PROCEDURE — 84443 ASSAY THYROID STIM HORMONE: CPT

## 2018-08-29 PROCEDURE — 85025 COMPLETE CBC W/AUTO DIFF WBC: CPT

## 2018-08-29 PROCEDURE — 80061 LIPID PANEL: CPT

## 2018-10-09 ENCOUNTER — HOSPITAL ENCOUNTER (OUTPATIENT)
Dept: HOSPITAL 58 - RAD | Age: 38
Discharge: HOME | End: 2018-10-09
Attending: PAIN MEDICINE

## 2018-10-09 VITALS — BODY MASS INDEX: 22.4 KG/M2

## 2018-10-09 DIAGNOSIS — M54.5: ICD-10-CM

## 2018-10-09 DIAGNOSIS — M54.6: Primary | ICD-10-CM

## 2018-10-09 DIAGNOSIS — M46.1: ICD-10-CM

## 2018-10-30 ENCOUNTER — HOSPITAL ENCOUNTER (OUTPATIENT)
Dept: HOSPITAL 58 - RHC-LAB | Age: 38
Discharge: HOME | End: 2018-10-30
Attending: NURSE PRACTITIONER

## 2018-10-30 VITALS — BODY MASS INDEX: 22.4 KG/M2

## 2018-10-30 DIAGNOSIS — F41.1: Primary | ICD-10-CM

## 2018-10-30 PROCEDURE — 80306 DRUG TEST PRSMV INSTRMNT: CPT

## 2018-12-07 ENCOUNTER — HOSPITAL ENCOUNTER (OUTPATIENT)
Dept: HOSPITAL 58 - REHAB | Age: 38
LOS: 24 days | End: 2018-12-31
Attending: PAIN MEDICINE

## 2018-12-07 VITALS — BODY MASS INDEX: 22.4 KG/M2

## 2018-12-07 DIAGNOSIS — M54.6: ICD-10-CM

## 2018-12-07 DIAGNOSIS — M51.34: ICD-10-CM

## 2018-12-07 DIAGNOSIS — M54.2: ICD-10-CM

## 2018-12-07 DIAGNOSIS — M48.02: ICD-10-CM

## 2018-12-07 DIAGNOSIS — M79.10: ICD-10-CM

## 2018-12-07 DIAGNOSIS — M47.9: ICD-10-CM

## 2018-12-07 DIAGNOSIS — M50.30: Primary | ICD-10-CM

## 2018-12-07 DIAGNOSIS — M62.89: ICD-10-CM

## 2019-01-22 DIAGNOSIS — F10.939 ALCOHOL WITHDRAWAL SYNDROME WITH COMPLICATION (HCC): ICD-10-CM

## 2019-01-22 DIAGNOSIS — F10.20 ALCOHOLISM (HCC): ICD-10-CM

## 2019-01-22 RX ORDER — OMEPRAZOLE 20 MG/1
CAPSULE, DELAYED RELEASE ORAL
Qty: 30 CAPSULE | Refills: 5 | OUTPATIENT
Start: 2019-01-22

## 2019-01-24 DIAGNOSIS — F10.939 ALCOHOL WITHDRAWAL SYNDROME WITH COMPLICATION (HCC): ICD-10-CM

## 2019-01-24 DIAGNOSIS — F10.20 ALCOHOLISM (HCC): ICD-10-CM

## 2019-01-24 RX ORDER — OMEPRAZOLE 20 MG/1
CAPSULE, DELAYED RELEASE ORAL
Qty: 30 CAPSULE | Refills: 5 | Status: SHIPPED | OUTPATIENT
Start: 2019-01-24

## 2019-05-06 ENCOUNTER — HOSPITAL ENCOUNTER (OUTPATIENT)
Dept: HOSPITAL 58 - RHC-LAB | Age: 39
Discharge: HOME | End: 2019-05-06
Attending: FAMILY MEDICINE

## 2019-05-06 VITALS — BODY MASS INDEX: 22.4 KG/M2

## 2019-05-06 DIAGNOSIS — G25.0: ICD-10-CM

## 2019-05-06 DIAGNOSIS — E78.2: Primary | ICD-10-CM

## 2019-05-06 DIAGNOSIS — F41.1: ICD-10-CM

## 2019-05-06 DIAGNOSIS — F10.11: ICD-10-CM

## 2019-05-06 DIAGNOSIS — I10: ICD-10-CM

## 2019-05-06 PROCEDURE — 80053 COMPREHEN METABOLIC PANEL: CPT

## 2019-05-06 PROCEDURE — 80061 LIPID PANEL: CPT

## 2019-05-06 PROCEDURE — 85730 THROMBOPLASTIN TIME PARTIAL: CPT

## 2019-05-06 PROCEDURE — 36415 COLL VENOUS BLD VENIPUNCTURE: CPT

## 2019-05-06 PROCEDURE — 84443 ASSAY THYROID STIM HORMONE: CPT

## 2019-05-06 PROCEDURE — 85610 PROTHROMBIN TIME: CPT

## 2019-05-06 PROCEDURE — 85025 COMPLETE CBC W/AUTO DIFF WBC: CPT

## 2019-08-19 ENCOUNTER — HOSPITAL ENCOUNTER (EMERGENCY)
Dept: HOSPITAL 58 - ED | Age: 39
Discharge: HOME | End: 2019-08-19

## 2019-08-19 VITALS — SYSTOLIC BLOOD PRESSURE: 121 MMHG | TEMPERATURE: 98.9 F | DIASTOLIC BLOOD PRESSURE: 85 MMHG

## 2019-08-19 VITALS — BODY MASS INDEX: 24.2 KG/M2

## 2019-08-19 DIAGNOSIS — F17.210: ICD-10-CM
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
## 2019-08-19 DIAGNOSIS — S50.861A: Primary | ICD-10-CM

## 2019-08-19 DIAGNOSIS — L08.9: ICD-10-CM

## 2019-08-19 PROCEDURE — 99282 EMERGENCY DEPT VISIT SF MDM: CPT

## 2020-07-13 DIAGNOSIS — F10.20 ALCOHOLISM (HCC): ICD-10-CM

## 2020-07-13 DIAGNOSIS — F10.939 ALCOHOL WITHDRAWAL SYNDROME WITH COMPLICATION (HCC): ICD-10-CM

## 2020-07-13 RX ORDER — OMEPRAZOLE 20 MG/1
CAPSULE, DELAYED RELEASE ORAL
Qty: 30 CAPSULE | Refills: 5 | OUTPATIENT
Start: 2020-07-13

## 2020-07-16 DIAGNOSIS — F10.20 ALCOHOLISM (HCC): ICD-10-CM

## 2020-07-16 DIAGNOSIS — F10.939 ALCOHOL WITHDRAWAL SYNDROME WITH COMPLICATION (HCC): ICD-10-CM

## 2020-07-16 RX ORDER — OMEPRAZOLE 20 MG/1
CAPSULE, DELAYED RELEASE ORAL
Qty: 30 CAPSULE | Refills: 5 | OUTPATIENT
Start: 2020-07-16

## 2020-08-18 DIAGNOSIS — F10.939 ALCOHOL WITHDRAWAL SYNDROME WITH COMPLICATION (HCC): ICD-10-CM

## 2020-08-18 DIAGNOSIS — F10.20 ALCOHOLISM (HCC): ICD-10-CM

## 2020-08-18 RX ORDER — OMEPRAZOLE 20 MG/1
CAPSULE, DELAYED RELEASE ORAL
Qty: 30 CAPSULE | Refills: 5 | OUTPATIENT
Start: 2020-08-18

## 2022-03-28 ENCOUNTER — HOSPITAL ENCOUNTER (EMERGENCY)
Facility: HOSPITAL | Age: 42
Discharge: HOME | End: 2022-03-28
Attending: EMERGENCY MEDICINE | Admitting: EMERGENCY MEDICINE

## 2022-03-28 ENCOUNTER — APPOINTMENT (OUTPATIENT)
Dept: CT IMAGING | Facility: HOSPITAL | Age: 42
End: 2022-03-28

## 2022-03-28 VITALS
BODY MASS INDEX: 25.77 KG/M2 | SYSTOLIC BLOOD PRESSURE: 119 MMHG | HEIGHT: 69 IN | OXYGEN SATURATION: 100 % | TEMPERATURE: 98.1 F | DIASTOLIC BLOOD PRESSURE: 86 MMHG | HEART RATE: 68 BPM | WEIGHT: 174 LBS | RESPIRATION RATE: 20 BRPM

## 2022-03-28 DIAGNOSIS — R03.0 ELEVATED BLOOD PRESSURE READING: ICD-10-CM

## 2022-03-28 DIAGNOSIS — K80.20 GALLSTONES: ICD-10-CM

## 2022-03-28 DIAGNOSIS — R10.9 LEFT FLANK PAIN: Primary | ICD-10-CM

## 2022-03-28 LAB
ALBUMIN SERPL-MCNC: 4.8 G/DL (ref 3.5–5.2)
ALBUMIN/GLOB SERPL: 1.8 G/DL
ALP SERPL-CCNC: 71 U/L (ref 39–117)
ALT SERPL W P-5'-P-CCNC: 14 U/L (ref 1–41)
ANION GAP SERPL CALCULATED.3IONS-SCNC: 10 MMOL/L (ref 5–15)
AST SERPL-CCNC: 14 U/L (ref 1–40)
BASOPHILS # BLD AUTO: 0.02 10*3/MM3 (ref 0–0.2)
BASOPHILS NFR BLD AUTO: 0.3 % (ref 0–1.5)
BILIRUB SERPL-MCNC: 0.5 MG/DL (ref 0–1.2)
BILIRUB UR QL STRIP: ABNORMAL
BUN SERPL-MCNC: 9 MG/DL (ref 6–20)
BUN/CREAT SERPL: 10.1 (ref 7–25)
CALCIUM SPEC-SCNC: 10 MG/DL (ref 8.6–10.5)
CHLORIDE SERPL-SCNC: 102 MMOL/L (ref 98–107)
CLARITY UR: CLEAR
CO2 SERPL-SCNC: 29 MMOL/L (ref 22–29)
COLOR UR: ABNORMAL
CREAT SERPL-MCNC: 0.89 MG/DL (ref 0.76–1.27)
DEPRECATED RDW RBC AUTO: 40.9 FL (ref 37–54)
EGFRCR SERPLBLD CKD-EPI 2021: 109.7 ML/MIN/1.73
EOSINOPHIL # BLD AUTO: 0.08 10*3/MM3 (ref 0–0.4)
EOSINOPHIL NFR BLD AUTO: 1.1 % (ref 0.3–6.2)
ERYTHROCYTE [DISTWIDTH] IN BLOOD BY AUTOMATED COUNT: 12.2 % (ref 12.3–15.4)
GLOBULIN UR ELPH-MCNC: 2.7 GM/DL
GLUCOSE SERPL-MCNC: 117 MG/DL (ref 65–99)
GLUCOSE UR STRIP-MCNC: ABNORMAL MG/DL
HCT VFR BLD AUTO: 52.9 % (ref 37.5–51)
HGB BLD-MCNC: 18.1 G/DL (ref 13–17.7)
HGB UR QL STRIP.AUTO: NEGATIVE
HOLD SPECIMEN: NORMAL
IMM GRANULOCYTES # BLD AUTO: 0.01 10*3/MM3 (ref 0–0.05)
IMM GRANULOCYTES NFR BLD AUTO: 0.1 % (ref 0–0.5)
KETONES UR QL STRIP: ABNORMAL
LEUKOCYTE ESTERASE UR QL STRIP.AUTO: NEGATIVE
LIPASE SERPL-CCNC: 31 U/L (ref 13–60)
LYMPHOCYTES # BLD AUTO: 1.64 10*3/MM3 (ref 0.7–3.1)
LYMPHOCYTES NFR BLD AUTO: 21.9 % (ref 19.6–45.3)
MCH RBC QN AUTO: 31.3 PG (ref 26.6–33)
MCHC RBC AUTO-ENTMCNC: 34.2 G/DL (ref 31.5–35.7)
MCV RBC AUTO: 91.5 FL (ref 79–97)
MONOCYTES # BLD AUTO: 0.72 10*3/MM3 (ref 0.1–0.9)
MONOCYTES NFR BLD AUTO: 9.6 % (ref 5–12)
NEUTROPHILS NFR BLD AUTO: 5.01 10*3/MM3 (ref 1.7–7)
NEUTROPHILS NFR BLD AUTO: 67 % (ref 42.7–76)
NITRITE UR QL STRIP: NEGATIVE
NRBC BLD AUTO-RTO: 0 /100 WBC (ref 0–0.2)
PH UR STRIP.AUTO: 5.5 [PH] (ref 5–8)
PLATELET # BLD AUTO: 205 10*3/MM3 (ref 140–450)
PMV BLD AUTO: 10.2 FL (ref 6–12)
POTASSIUM SERPL-SCNC: 3.9 MMOL/L (ref 3.5–5.2)
PROT SERPL-MCNC: 7.5 G/DL (ref 6–8.5)
PROT UR QL STRIP: NEGATIVE
RBC # BLD AUTO: 5.78 10*6/MM3 (ref 4.14–5.8)
SODIUM SERPL-SCNC: 141 MMOL/L (ref 136–145)
SP GR UR STRIP: >1.03 (ref 1–1.03)
UROBILINOGEN UR QL STRIP: ABNORMAL
WBC NRBC COR # BLD: 7.48 10*3/MM3 (ref 3.4–10.8)
WHOLE BLOOD HOLD SPECIMEN: NORMAL
WHOLE BLOOD HOLD SPECIMEN: NORMAL

## 2022-03-28 PROCEDURE — 25010000002 KETOROLAC TROMETHAMINE PER 15 MG: Performed by: EMERGENCY MEDICINE

## 2022-03-28 PROCEDURE — 99283 EMERGENCY DEPT VISIT LOW MDM: CPT

## 2022-03-28 PROCEDURE — 96374 THER/PROPH/DIAG INJ IV PUSH: CPT

## 2022-03-28 PROCEDURE — 81003 URINALYSIS AUTO W/O SCOPE: CPT | Performed by: EMERGENCY MEDICINE

## 2022-03-28 PROCEDURE — 83690 ASSAY OF LIPASE: CPT | Performed by: EMERGENCY MEDICINE

## 2022-03-28 PROCEDURE — 80053 COMPREHEN METABOLIC PANEL: CPT | Performed by: EMERGENCY MEDICINE

## 2022-03-28 PROCEDURE — 74176 CT ABD & PELVIS W/O CONTRAST: CPT

## 2022-03-28 PROCEDURE — 85025 COMPLETE CBC W/AUTO DIFF WBC: CPT | Performed by: EMERGENCY MEDICINE

## 2022-03-28 RX ORDER — NAPROXEN 500 MG/1
500 TABLET ORAL 2 TIMES DAILY PRN
Qty: 20 TABLET | Refills: 0 | Status: SHIPPED | OUTPATIENT
Start: 2022-03-28

## 2022-03-28 RX ORDER — KETOROLAC TROMETHAMINE 30 MG/ML
30 INJECTION, SOLUTION INTRAMUSCULAR; INTRAVENOUS ONCE
Status: COMPLETED | OUTPATIENT
Start: 2022-03-28 | End: 2022-03-28

## 2022-03-28 RX ORDER — CYCLOBENZAPRINE HCL 10 MG
10 TABLET ORAL 3 TIMES DAILY PRN
Qty: 15 TABLET | Refills: 0 | Status: SHIPPED | OUTPATIENT
Start: 2022-03-28

## 2022-03-28 RX ORDER — CYCLOBENZAPRINE HCL 10 MG
10 TABLET ORAL ONCE
Status: COMPLETED | OUTPATIENT
Start: 2022-03-28 | End: 2022-03-28

## 2022-03-28 RX ORDER — CLONIDINE HYDROCHLORIDE 0.1 MG/1
0.1 TABLET ORAL ONCE
Status: DISCONTINUED | OUTPATIENT
Start: 2022-03-28 | End: 2022-03-28 | Stop reason: HOSPADM

## 2022-03-28 RX ORDER — SODIUM CHLORIDE 0.9 % (FLUSH) 0.9 %
10 SYRINGE (ML) INJECTION AS NEEDED
Status: DISCONTINUED | OUTPATIENT
Start: 2022-03-28 | End: 2022-03-28 | Stop reason: HOSPADM

## 2022-03-28 RX ADMIN — CYCLOBENZAPRINE 10 MG: 10 TABLET, FILM COATED ORAL at 08:19

## 2022-03-28 RX ADMIN — KETOROLAC TROMETHAMINE 30 MG: 30 INJECTION, SOLUTION INTRAMUSCULAR; INTRAVENOUS at 08:19

## 2022-03-28 RX ADMIN — SODIUM CHLORIDE 500 ML: 9 INJECTION, SOLUTION INTRAVENOUS at 08:19
